# Patient Record
Sex: FEMALE | Race: BLACK OR AFRICAN AMERICAN | Employment: FULL TIME | URBAN - METROPOLITAN AREA
[De-identification: names, ages, dates, MRNs, and addresses within clinical notes are randomized per-mention and may not be internally consistent; named-entity substitution may affect disease eponyms.]

---

## 2017-02-17 ENCOUNTER — OFFICE VISIT (OUTPATIENT)
Dept: FAMILY MEDICINE CLINIC | Age: 47
End: 2017-02-17

## 2017-02-17 VITALS
BODY MASS INDEX: 31.72 KG/M2 | HEIGHT: 61 IN | TEMPERATURE: 97.5 F | DIASTOLIC BLOOD PRESSURE: 77 MMHG | SYSTOLIC BLOOD PRESSURE: 121 MMHG | RESPIRATION RATE: 20 BRPM | OXYGEN SATURATION: 98 % | WEIGHT: 168 LBS | HEART RATE: 83 BPM

## 2017-02-17 DIAGNOSIS — H10.12 ALLERGIC CONJUNCTIVITIS, LEFT: ICD-10-CM

## 2017-02-17 DIAGNOSIS — F41.9 ANXIETY: Primary | ICD-10-CM

## 2017-02-17 RX ORDER — RANITIDINE 150 MG/1
150 TABLET, FILM COATED ORAL 2 TIMES DAILY
COMMUNITY
End: 2017-10-06 | Stop reason: ALTCHOICE

## 2017-02-17 RX ORDER — LORAZEPAM 0.5 MG/1
TABLET ORAL
Qty: 30 TAB | Refills: 0 | Status: SHIPPED | OUTPATIENT
Start: 2017-02-17 | End: 2017-10-06 | Stop reason: ALTCHOICE

## 2017-02-17 RX ORDER — POLYMYXIN B SULFATE AND TRIMETHOPRIM 1; 10000 MG/ML; [USP'U]/ML
1 SOLUTION OPHTHALMIC EVERY 4 HOURS
Qty: 10 ML | Refills: 0 | Status: SHIPPED | OUTPATIENT
Start: 2017-02-17 | End: 2017-02-27

## 2017-02-17 NOTE — PATIENT INSTRUCTIONS
Pinkeye: Care Instructions  Your Care Instructions    Pinkeye is redness and swelling of the eye surface and the conjunctiva (the lining of the eyelid and the covering of the white part of the eye). Pinkeye is also called conjunctivitis. Pinkeye is often caused by infection with bacteria or a virus. Dry air, allergies, smoke, and chemicals are other common causes. Pinkeye often clears on its own in 7 to 10 days. Antibiotics only help if the pinkeye is caused by bacteria. Pinkeye caused by infection spreads easily. If an allergy or chemical is causing pinkeye, it will not go away unless you can avoid whatever is causing it. Follow-up care is a key part of your treatment and safety. Be sure to make and go to all appointments, and call your doctor if you are having problems. Its also a good idea to know your test results and keep a list of the medicines you take. How can you care for yourself at home? · Wash your hands often. Always wash them before and after you treat pinkeye or touch your eyes or face. · Use moist cotton or a clean, wet cloth to remove crust. Wipe from the inside corner of the eye to the outside. Use a clean part of the cloth for each wipe. · Put cold or warm wet cloths on your eye a few times a day if the eye hurts. · Do not wear contact lenses or eye makeup until the pinkeye is gone. Throw away any eye makeup you were using when you got pinkeye. Clean your contacts and storage case. If you wear disposable contacts, use a new pair when your eye has cleared and it is safe to wear contacts again. · If the doctor gave you antibiotic ointment or eyedrops, use them as directed. Use the medicine for as long as instructed, even if your eye starts looking better soon. Keep the bottle tip clean, and do not let it touch the eye area. · To put in eyedrops or ointment:  ¨ Tilt your head back, and pull your lower eyelid down with one finger.   ¨ Drop or squirt the medicine inside the lower lid.  ¨ Close your eye for 30 to 60 seconds to let the drops or ointment move around. ¨ Do not touch the ointment or dropper tip to your eyelashes or any other surface. · Do not share towels, pillows, or washcloths while you have pinkeye. When should you call for help? Call your doctor now or seek immediate medical care if:  · You have pain in your eye, not just irritation on the surface. · You have a change in vision or loss of vision. · You have an increase in discharge from the eye. · Your eye has not started to improve or begins to get worse within 48 hours after you start using antibiotics. · Pinkeye lasts longer than 7 days. Watch closely for changes in your health, and be sure to contact your doctor if you have any problems. Where can you learn more? Go to http://leanna-lee.info/. Enter Y392 in the search box to learn more about \"Pinkeye: Care Instructions. \"  Current as of: May 27, 2016  Content Version: 11.1  © 4450-8801 [employer]. Care instructions adapted under license by [employer] (which disclaims liability or warranty for this information). If you have questions about a medical condition or this instruction, always ask your healthcare professional. [address] any warranty or liability for your use of this information.

## 2017-02-17 NOTE — MR AVS SNAPSHOT
Visit Information Date & Time Provider Department Dept. Phone Encounter #  
 2/17/2017  9:30 AM Layla Ramríez MD [facility] at [address] [id number] Follow-up Instructions Return if symptoms worsen or fail to improve, for routine follow up. Upcoming Health Maintenance Date Due Pneumococcal 19-64 Highest Risk (1 of 3 - PCV13) 5/8/1988 PAP AKA CERVICAL CYTOLOGY 5/18/2017* DTaP/Tdap/Td series (2 - Td) 8/19/2026 *Topic was postponed. The date shown is not the original due date. Allergies as of 2/17/2017  Review Complete On: 2/17/2017 By: Layla Ramírez MD  
  
 Severity Noted Reaction Type Reactions Protonix [Pantoprazole] High 06/06/2010   Systemic Swelling Erythromycin Medium 06/06/2010   Side Effect Nausea and Vomiting Doxycycline  11/16/2011    Nausea and Vomiting Influenza Virus Vaccine [facility] 2012-13 (18+) Cell Prieto  06/27/2016    Unknown (comments) Patient stated \"it effected my whole body. \"  
 Sulfa (Sulfonamide Antibiotics)  08/31/2016    Unknown (comments) Pt states she cannot get sulfa due to it affecting her kidneys Augmentin [Amoxicillin-pot Clavulanate] Low 06/06/2010   Side Effect Nausea Only Current Immunizations  Never Reviewed No immunizations on file. Not reviewed this visit You Were Diagnosed With   
  
 Codes Comments Anxiety    -  Primary ICD-10-CM: F41.9 ICD-9-CM: 300.00 Allergic conjunctivitis, left     ICD-10-CM: H10.12 ICD-9-CM: 372.14 Vitals BP Pulse Temp Resp Height(growth percentile) Weight(growth percentile) 121/77 (BP 1 Location: Left arm, BP Patient Position: Sitting) 83 97.5 °F (36.4 °C) (Oral) 20 5' 1\" (1.549 m) 168 lb (76.2 kg) LMP SpO2 BMI OB Status Smoking Status 01/26/2017 98% 31.74 kg/m2 Having regular periods Never Smoker Vitals History BMI and BSA Data Body Mass Index Body Surface Area 31.74 kg/m 2 1.81 m 2 Preferred Pharmacy Pharmacy Name Phone [address] [facility] [phone] Your Updated Medication List  
  
   
This list is accurate as of: 2/17/17 10:57 AM.  Always use your most recent med list.  
  
  
  
  
 BABY ASPIRIN 81 mg chewable tablet Generic drug:  aspirin Take 81 mg by mouth daily. Indications: ACUTE CORONARY SYNDROME  
  
 clindamycin 1 % lotion Commonly known as:  CLEOCIN T Apply  to affected area as needed. use thin film on affected area FISH OIL 1,000 mg Cap Generic drug:  omega-3 fatty acids-vitamin e Take 1 Cap by mouth. fluocinoLONE 0.01 % cream  
Commonly known as:  SYNALAR Apply  to affected area two (2) times daily as needed for Skin Irritation. LORazepam 0.5 mg tablet Commonly known as:  ATIVAN TK 1 T PO ONCE QD PRA  
  
 multivitamin tablet Commonly known as:  ONE A DAY Take 1 Tab by mouth daily. trimethoprim-polymyxin b ophthalmic solution Commonly known as:  POLYTRIM Administer 1 Drop to both eyes every four (4) hours for 10 days. ZANTAC 150 mg tablet Generic drug:  raNITIdine Take 150 mg by mouth two (2) times a day. Prescriptions Printed Refills LORazepam (ATIVAN) 0.5 mg tablet 0 Sig: TK 1 T PO ONCE QD PRA Class: Print Prescriptions Sent to Pharmacy Refills  
 trimethoprim-polymyxin b (POLYTRIM) ophthalmic solution 0 Sig: Administer 1 Drop to both eyes every four (4) hours for 10 days. Class: Normal  
 Pharmacy: [facility] [address] [facility] Ph #: [phone] Route: Both Eyes Follow-up Instructions Return if symptoms worsen or fail to improve, for routine follow up. Patient Instructions Pinkeye: Care Instructions Your Care Instructions Pinkeye is redness and swelling of the eye surface and the conjunctiva (the lining of the eyelid and the covering of the white part of the eye). Pinkeye is also called conjunctivitis. Pinkeye is often caused by infection with bacteria or a virus. Dry air, allergies, smoke, and chemicals are other common causes. Pinkeye often clears on its own in 7 to 10 days. Antibiotics only help if the pinkeye is caused by bacteria. Pinkeye caused by infection spreads easily. If an allergy or chemical is causing pinkeye, it will not go away unless you can avoid whatever is causing it. Follow-up care is a key part of your treatment and safety. Be sure to make and go to all appointments, and call your doctor if you are having problems. Its also a good idea to know your test results and keep a list of the medicines you take. How can you care for yourself at home? · Wash your hands often. Always wash them before and after you treat pinkeye or touch your eyes or face. · Use moist cotton or a clean, wet cloth to remove crust. Wipe from the inside corner of the eye to the outside. Use a clean part of the cloth for each wipe. · Put cold or warm wet cloths on your eye a few times a day if the eye hurts. · Do not wear contact lenses or eye makeup until the pinkeye is gone. Throw away any eye makeup you were using when you got pinkeye. Clean your contacts and storage case. If you wear disposable contacts, use a new pair when your eye has cleared and it is safe to wear contacts again. · If the doctor gave you antibiotic ointment or eyedrops, use them as directed. Use the medicine for as long as instructed, even if your eye starts looking better soon. Keep the bottle tip clean, and do not let it touch the eye area. · To put in eyedrops or ointment: ¨ Tilt your head back, and pull your lower eyelid down with one finger. ¨ Drop or squirt the medicine inside the lower lid. ¨ Close your eye for 30 to 60 seconds to let the drops or ointment move around. ¨ Do not touch the ointment or dropper tip to your eyelashes or any other surface. · Do not share towels, pillows, or washcloths while you have pinkeye. When should you call for help? Call your doctor now or seek immediate medical care if: 
· You have pain in your eye, not just irritation on the surface. · You have a change in vision or loss of vision. · You have an increase in discharge from the eye. · Your eye has not started to improve or begins to get worse within 48 hours after you start using antibiotics. · Pinkeye lasts longer than 7 days. Watch closely for changes in your health, and be sure to contact your doctor if you have any problems. Where can you learn more? Go to http://leanna-lee.info/. Enter Y392 in the search box to learn more about \"Pinkeye: Care Instructions. \" Current as of: May 27, 2016 Content Version: 11.1 © 6201-5387 [employer]. Care instructions adapted under license by [employer] (which disclaims liability or warranty for this information). If you have questions about a medical condition or this instruction, always ask your healthcare professional. [address] any warranty or liability for your use of this information. Introducing [facility] [place] [facility]! Dear Michael Allred: Thank you for requesting a [employer] account. Our records indicate that you already have an active [employer] account. You can access your account anytime at [email or web address] Did you know that you can access your hospital and ER discharge instructions at any time in [employer]? You can also review all of your test results from your hospital stay or ER visit. Additional Information If you have questions, please visit the Frequently Asked Questions section of the [employer] website at [email or web address] Remember, [employer] is NOT to be used for urgent needs. For medical emergencies, dial 911. Now available from your iPhone and Android! Please provide this summary of care documentation to your next provider. Your primary care clinician is listed as Yaneth Burger. If you have any questions after today's visit, please call [phone].

## 2017-02-17 NOTE — PROGRESS NOTES
Chief Complaint   Patient presents with    Medication Refill    Other     ITCHY EYE    Cough     HPI:  Christine Restrepo is a 51 y.o. AA female presenting for anxiety medication refill, evaluation of itching eye and cough that started 2 weeks prior. She has been using otc treatment, cough is mostly gone but left eye is still itching. Review of Systems  As per hpi    Past Medical History   Diagnosis Date    Anxiety     Chronic shoulder pain     Gastrointestinal disorder      GERD    GERD (gastroesophageal reflux disease)     Headache     Migraine headache     Neck pain, bilateral     Neurological disorder      migraines    Other ill-defined conditions(129.89)      migraines    Psychiatric disorder      anxiety     Past Surgical History   Procedure Laterality Date    Hx heent       tonsillectomy    Hx gyn       cyst removed left ovary    Hx orthopaedic       left toe arthroplasty    Hx other surgical       left breast biopsy    Hx other surgical       wisdom teeth    Pr breast surgery procedure unlisted Left      Biopsy    Hx wisdom teeth extraction Bilateral      Social History    Marital status: SINGLE     Spouse name: N/A    Number of children: N/A    Years of education: N/A     Social History Main Topics    Smoking status: Never Smoker    Smokeless tobacco: Never Used    Alcohol use No    Drug use: No    Sexual activity: Yes     Partners: Female     Birth control/ protection: Condom     Current Outpatient Prescriptions   Medication Sig Dispense Refill    raNITIdine (ZANTAC) 150 mg tablet Take 150 mg by mouth two (2) times a day.  clindamycin (CLEOCIN T) 1 % lotion Apply  to affected area as needed. use thin film on affected area      fluocinoLONE (SYNALAR) 0.01 % cream Apply  to affected area two (2) times daily as needed for Skin Irritation.  LORazepam (ATIVAN) 0.5 mg tablet TK 1 T PO ONCE QD PRA  0    multivitamin (ONE A DAY) tablet Take 1 Tab by mouth daily.       omega-3 fatty acids-vitamin e (FISH OIL) 1,000 mg cap Take 1 Cap by mouth.  aspirin (BABY ASPIRIN) 81 mg chewable tablet Take 81 mg by mouth daily. Indications: ACUTE CORONARY SYNDROME       Allergies   Allergen Reactions    Protonix [Pantoprazole] Swelling    Erythromycin Nausea and Vomiting    Doxycycline Nausea and Vomiting    Influenza Virus Vaccine [facility] 2012-13 (18+) Cell Prieto Unknown (comments)     Patient stated \"it effected my whole body. \"    Sulfa (Sulfonamide Antibiotics) Unknown (comments)     Pt states she cannot get sulfa due to it affecting her kidneys    Augmentin [Amoxicillin-Pot Clavulanate] Nausea Only       Objective:  Visit Vitals    /77 (BP 1 Location: Left arm, BP Patient Position: Sitting)    Pulse 83    Temp 97.5 °F (36.4 °C) (Oral)    Resp 20    Ht 5' 1\" (1.549 m)    Wt 168 lb (76.2 kg)    LMP 01/26/2017    SpO2 98%    BMI 31.74 kg/m2     Physical Exam:   General appearance - alert, well appearing, in no distress  Mental status - alert, oriented to person, place, and time  EYE-PERRL, EOMI, mild left conjunctivae injection noted  Neck - supple, no significant adenopathy   Chest - clear to auscultation, no wheezes, rales or rhonchi  Heart - normal rate, regular rhythm, normal blood pressure  Ext-peripheral pulses normal, no pedal edema  Neuro -alert, oriented, normal speech, no focal findings     Results for orders placed or performed in visit on 40/41/23   METABOLIC PANEL, COMPREHENSIVE   Result Value Ref Range    Glucose 77 65 - 99 mg/dL    BUN 18 6 - 24 mg/dL    Creatinine 1.12 (H) 0.57 - 1.00 mg/dL    GFR est non-AA 59 (L) >59 mL/min/1.73    GFR est AA 68 >59 mL/min/1.73    BUN/Creatinine ratio 16 9 - 23    Sodium 137 136 - 144 mmol/L    Potassium 4.5 3.5 - 5.2 mmol/L    Chloride 99 97 - 106 mmol/L    CO2 24 18 - 29 mmol/L    Calcium 9.3 8.7 - 10.2 mg/dL    Protein, total 7.8 6.0 - 8.5 g/dL    Albumin 4.4 3.5 - 5.5 g/dL    GLOBULIN, TOTAL 3.4 1.5 - 4.5 g/dL    A-G Ratio 1.3 1.1 - 2.5    Bilirubin, total 0.3 0.0 - 1.2 mg/dL    Alk. phosphatase 52 39 - 117 IU/L    AST (SGOT) 20 0 - 40 IU/L    ALT (SGPT) 16 0 - 32 IU/L   VITAMIN D, 25 HYDROXY   Result Value Ref Range    VITAMIN D, 25-HYDROXY 35.7 30.0 - 100.0 ng/mL   HEMOGLOBIN A1C WITH EAG   Result Value Ref Range    Hemoglobin A1c 5.9 (H) 4.8 - 5.6 %    Estimated average glucose 123 mg/dL   UA/M W/RFLX CULTURE, ROUTINE   Result Value Ref Range    Specific Gravity 1.025 1.005 - 1.030    pH (UA) 6.0 5.0 - 7.5    Color Yellow Yellow    Appearance Clear Clear    Leukocyte Esterase Negative Negative    Protein Negative Negative/Trace    Glucose Negative Negative    Ketone Negative Negative    Blood 3+ (A) Negative    Bilirubin Negative Negative    Urobilinogen 0.2 0.2 - 1.0 mg/dL    Nitrites Negative Negative    Microscopic Examination See additional order     URINALYSIS REFLEX Comment    LIPID PANEL   Result Value Ref Range    Cholesterol, total 193 100 - 199 mg/dL    Triglyceride 44 0 - 149 mg/dL    HDL Cholesterol 74 >39 mg/dL    VLDL, calculated 9 5 - 40 mg/dL    LDL, calculated 110 (H) 0 - 99 mg/dL   MICROSCOPIC EXAMINATION   Result Value Ref Range    WBC 0-5 0 - 5 /hpf    RBC 11-30 (A) 0 - 2 /hpf    Epithelial cells 0-10 0 - 10 /hpf    Casts None seen None seen /lpf    Mucus Present Not Estab. Bacteria Few None seen/Few     Assessment/Plan:    ICD-10-CM ICD-9-CM    1. Anxiety F41.9 300.00 LORazepam (ATIVAN) 0.5 mg tablet   2. Allergic conjunctivitis, left H10.12 372.14 trimethoprim-polymyxin b (POLYTRIM) ophthalmic solution     Patient Instructions        Pinkeye: Care Instructions  Your Care Instructions    Pinkeye is redness and swelling of the eye surface and the conjunctiva (the lining of the eyelid and the covering of the white part of the eye). Pinkeye is also called conjunctivitis. Pinkeye is often caused by infection with bacteria or a virus. Dry air, allergies, smoke, and chemicals are other common causes.   Pinkeye often clears on its own in 7 to 10 days. Antibiotics only help if the pinkeye is caused by bacteria. Pinkeye caused by infection spreads easily. If an allergy or chemical is causing pinkeye, it will not go away unless you can avoid whatever is causing it. Follow-up care is a key part of your treatment and safety. Be sure to make and go to all appointments, and call your doctor if you are having problems. Its also a good idea to know your test results and keep a list of the medicines you take. How can you care for yourself at home? · Wash your hands often. Always wash them before and after you treat pinkeye or touch your eyes or face. · Use moist cotton or a clean, wet cloth to remove crust. Wipe from the inside corner of the eye to the outside. Use a clean part of the cloth for each wipe. · Put cold or warm wet cloths on your eye a few times a day if the eye hurts. · Do not wear contact lenses or eye makeup until the pinkeye is gone. Throw away any eye makeup you were using when you got pinkeye. Clean your contacts and storage case. If you wear disposable contacts, use a new pair when your eye has cleared and it is safe to wear contacts again. · If the doctor gave you antibiotic ointment or eyedrops, use them as directed. Use the medicine for as long as instructed, even if your eye starts looking better soon. Keep the bottle tip clean, and do not let it touch the eye area. · To put in eyedrops or ointment:  ¨ Tilt your head back, and pull your lower eyelid down with one finger. ¨ Drop or squirt the medicine inside the lower lid. ¨ Close your eye for 30 to 60 seconds to let the drops or ointment move around. ¨ Do not touch the ointment or dropper tip to your eyelashes or any other surface. · Do not share towels, pillows, or washcloths while you have pinkeye. When should you call for help?   Call your doctor now or seek immediate medical care if:  · You have pain in your eye, not just irritation on the surface. · You have a change in vision or loss of vision. · You have an increase in discharge from the eye. · Your eye has not started to improve or begins to get worse within 48 hours after you start using antibiotics. · Pinkeye lasts longer than 7 days. Watch closely for changes in your health, and be sure to contact your doctor if you have any problems. Where can you learn more? Go to http://leanna-lee.info/. Enter Y392 in the search box to learn more about \"Pinkeye: Care Instructions. \"  Current as of: May 27, 2016  Content Version: 11.1  © 7125-2372 [employer]. Care instructions adapted under license by [employer] (which disclaims liability or warranty for this information). If you have questions about a medical condition or this instruction, always ask your healthcare professional. [address] any warranty or liability for your use of this information. Follow-up Disposition:  Return if symptoms worsen or fail to improve, for routine follow up.

## 2017-02-17 NOTE — PROGRESS NOTES
1. Have you been to the ER, urgent care clinic since your last visit? Hospitalized since your last visit? No    2. Have you seen or consulted any other health care providers outside of the [address] [place] [address] since your last visit? Include any pap smears or colon screening. No      Pt states she is here for med refills and cold for 2 weeks.  States she still has a slight cough

## 2017-05-10 ENCOUNTER — OFFICE VISIT (OUTPATIENT)
Dept: FAMILY MEDICINE CLINIC | Age: 47
End: 2017-05-10

## 2017-05-10 VITALS
BODY MASS INDEX: 31.15 KG/M2 | DIASTOLIC BLOOD PRESSURE: 84 MMHG | WEIGHT: 165 LBS | HEART RATE: 80 BPM | RESPIRATION RATE: 16 BRPM | HEIGHT: 61 IN | OXYGEN SATURATION: 97 % | SYSTOLIC BLOOD PRESSURE: 138 MMHG | TEMPERATURE: 98.1 F

## 2017-05-10 DIAGNOSIS — N17.9 ACUTE RENAL FAILURE, UNSPECIFIED ACUTE RENAL FAILURE TYPE (HCC): ICD-10-CM

## 2017-05-10 DIAGNOSIS — E78.5 DYSLIPIDEMIA (HIGH LDL; LOW HDL): ICD-10-CM

## 2017-05-10 DIAGNOSIS — R03.0 ELEVATED BLOOD-PRESSURE READING, WITHOUT DIAGNOSIS OF HYPERTENSION: ICD-10-CM

## 2017-05-10 DIAGNOSIS — E55.9 HYPOVITAMINOSIS D: Primary | ICD-10-CM

## 2017-05-10 DIAGNOSIS — R73.02 IGT (IMPAIRED GLUCOSE TOLERANCE): ICD-10-CM

## 2017-05-10 NOTE — PROGRESS NOTES
Chief Complaint   Patient presents with    Follow-up     HPI:  Kristy Chin is a 49 y.o. AA female presents follow-up. Patient is doing good. She saw GI in 4/2017, refilled gavascon, she saw neurologists yesterday for migraine. Blood pressure reading on arrival was 143/92, at end of visit VO=997/84. Patient does not want antihypertensive at this time  No complaints at this time. Review of Systems  As per hpi    Past Medical History:   Diagnosis Date    Anxiety     Chronic shoulder pain     Gastrointestinal disorder     GERD    GERD (gastroesophageal reflux disease)     Headache     Migraine headache     Neck pain, bilateral     Neurological disorder     migraines    Other ill-defined conditions     migraines    Psychiatric disorder     anxiety     Past Surgical History:   Procedure Laterality Date    BREAST SURGERY PROCEDURE UNLISTED Left     Biopsy    HX GYN      cyst removed left ovary    HX HEENT      tonsillectomy    HX ORTHOPAEDIC      left toe arthroplasty    HX OTHER SURGICAL      left breast biopsy    HX OTHER SURGICAL      wisdom teeth    HX WISDOM TEETH EXTRACTION Bilateral      Social History    Marital status: SINGLE     Spouse name: N/A    Number of children: N/A    Years of education: N/A     Social History Main Topics    Smoking status: Never Smoker    Smokeless tobacco: Never Used    Alcohol use No    Drug use: No    Sexual activity: Yes     Partners: Female     Birth control/ protection: Condom     Current Outpatient Prescriptions   Medication Sig Dispense Refill    raNITIdine (ZANTAC) 150 mg tablet Take 150 mg by mouth two (2) times a day.  LORazepam (ATIVAN) 0.5 mg tablet TK 1 T PO ONCE QD PRA 30 Tab 0    clindamycin (CLEOCIN T) 1 % lotion Apply  to affected area as needed. use thin film on affected area      fluocinoLONE (SYNALAR) 0.01 % cream Apply  to affected area two (2) times daily as needed for Skin Irritation.       multivitamin (ONE A DAY) tablet Take 1 Tab by mouth daily.  omega-3 fatty acids-vitamin e (FISH OIL) 1,000 mg cap Take 1 Cap by mouth.  aspirin (BABY ASPIRIN) 81 mg chewable tablet Take 81 mg by mouth daily. Indications: ACUTE CORONARY SYNDROME       Allergies   Allergen Reactions    Protonix [Pantoprazole] Swelling    Erythromycin Nausea and Vomiting    Doxycycline Nausea and Vomiting    Influenza Virus Vaccine [facility] 2012-13 (18+) Cell Prieto Unknown (comments)     Patient stated \"it effected my whole body. \"    Sulfa (Sulfonamide Antibiotics) Unknown (comments)     Pt states she cannot get sulfa due to it affecting her kidneys    Augmentin [Amoxicillin-Pot Clavulanate] Nausea Only     Objective:  Visit Vitals    BP (!) 143/92 (BP 1 Location: Left arm, BP Patient Position: Sitting)    Pulse 80    Temp 98.1 °F (36.7 °C) (Oral)    Resp 16    Ht 5' 1\" (1.549 m)    Wt 165 lb (74.8 kg)    LMP 04/28/2017    SpO2 97%    BMI 31.18 kg/m2     Physical Exam:   General appearance - alert, well appearing, in no distress  Mental status - alert, oriented to person, place, and time  EYE-PERRL, EOMI  Neck - supple, no significant adenopathy   Chest - clear to auscultation, no wheezes, rales or rhonchi  Heart - normal rate, regular rhythm, elevated blood pressure  Abdomen - soft, nontender, nondistended, no organomegaly  Ext-peripheral pulses normal, no pedal edema  Neuro -alert, oriented, normal speech, no focal findings    Results for orders placed or performed in visit on 51/02/27   METABOLIC PANEL, COMPREHENSIVE   Result Value Ref Range    Glucose 77 65 - 99 mg/dL    BUN 18 6 - 24 mg/dL    Creatinine 1.12 (H) 0.57 - 1.00 mg/dL    GFR est non-AA 59 (L) >59 mL/min/1.73    GFR est AA 68 >59 mL/min/1.73    BUN/Creatinine ratio 16 9 - 23    Sodium 137 136 - 144 mmol/L    Potassium 4.5 3.5 - 5.2 mmol/L    Chloride 99 97 - 106 mmol/L    CO2 24 18 - 29 mmol/L    Calcium 9.3 8.7 - 10.2 mg/dL    Protein, total 7.8 6.0 - 8.5 g/dL    Albumin 4.4 3.5 - 5.5 g/dL    GLOBULIN, TOTAL 3.4 1.5 - 4.5 g/dL    A-G Ratio 1.3 1.1 - 2.5    Bilirubin, total 0.3 0.0 - 1.2 mg/dL    Alk. phosphatase 52 39 - 117 IU/L    AST (SGOT) 20 0 - 40 IU/L    ALT (SGPT) 16 0 - 32 IU/L   VITAMIN D, 25 HYDROXY   Result Value Ref Range    VITAMIN D, 25-HYDROXY 35.7 30.0 - 100.0 ng/mL   HEMOGLOBIN A1C WITH EAG   Result Value Ref Range    Hemoglobin A1c 5.9 (H) 4.8 - 5.6 %    Estimated average glucose 123 mg/dL   UA/M W/RFLX CULTURE, ROUTINE   Result Value Ref Range    Specific Gravity 1.025 1.005 - 1.030    pH (UA) 6.0 5.0 - 7.5    Color Yellow Yellow    Appearance Clear Clear    Leukocyte Esterase Negative Negative    Protein Negative Negative/Trace    Glucose Negative Negative    Ketone Negative Negative    Blood 3+ (A) Negative    Bilirubin Negative Negative    Urobilinogen 0.2 0.2 - 1.0 mg/dL    Nitrites Negative Negative    Microscopic Examination See additional order     URINALYSIS REFLEX Comment    LIPID PANEL   Result Value Ref Range    Cholesterol, total 193 100 - 199 mg/dL    Triglyceride 44 0 - 149 mg/dL    HDL Cholesterol 74 >39 mg/dL    VLDL, calculated 9 5 - 40 mg/dL    LDL, calculated 110 (H) 0 - 99 mg/dL   MICROSCOPIC EXAMINATION   Result Value Ref Range    WBC 0-5 0 - 5 /hpf    RBC 11-30 (A) 0 - 2 /hpf    Epithelial cells 0-10 0 - 10 /hpf    Casts None seen None seen /lpf    Mucus Present Not Estab. Bacteria Few None seen/Few     Assessment/Plan:    ICD-10-CM ICD-9-CM    1. Hypovitaminosis D E55.9 268.9 VITAMIN D, 25 HYDROXY   2. Acute renal failure, unspecified acute renal failure type ([facility] [address] V15.3 619.5 METABOLIC PANEL, COMPREHENSIVE   3. Dyslipidemia (high LDL; low HDL) E78.4 272.4 LIPID PANEL   4. IGT (impaired glucose tolerance) R73.02 790.22 HEMOGLOBIN A1C WITH EAG   5.  Elevated blood-pressure reading, without diagnosis of hypertension R03.0 796.2 UA/M W/RFLX CULTURE, ROUTINE      METABOLIC PANEL, COMPREHENSIVE     Patient Instructions        DASH Diet: Care Instructions  Your Care Instructions  The DASH diet is an eating plan that can help lower your blood pressure. DASH stands for Dietary Approaches to Stop Hypertension. Hypertension is high blood pressure. The DASH diet focuses on eating foods that are high in calcium, potassium, and magnesium. These nutrients can lower blood pressure. The foods that are highest in these nutrients are fruits, vegetables, low-fat dairy products, nuts, seeds, and legumes. But taking calcium, potassium, and magnesium supplements instead of eating foods that are high in those nutrients does not have the same effect. The DASH diet also includes whole grains, fish, and poultry. The DASH diet is one of several lifestyle changes your doctor may recommend to lower your high blood pressure. Your doctor may also want you to decrease the amount of sodium in your diet. Lowering sodium while following the DASH diet can lower blood pressure even further than just the DASH diet alone. Follow-up care is a key part of your treatment and safety. Be sure to make and go to all appointments, and call your doctor if you are having problems. It's also a good idea to know your test results and keep a list of the medicines you take. How can you care for yourself at home? Following the DASH diet  · Eat 4 to 5 servings of fruit each day. A serving is 1 medium-sized piece of fruit, ½ cup chopped or canned fruit, 1/4 cup dried fruit, or 4 ounces (½ cup) of fruit juice. Choose fruit more often than fruit juice. · Eat 4 to 5 servings of vegetables each day. A serving is 1 cup of lettuce or raw leafy vegetables, ½ cup of chopped or cooked vegetables, or 4 ounces (½ cup) of vegetable juice. Choose vegetables more often than vegetable juice. · Get 2 to 3 servings of low-fat and fat-free dairy each day. A serving is 8 ounces of milk, 1 cup of yogurt, or 1 ½ ounces of cheese. · Eat 6 to 8 servings of grains each day.  A serving is 1 slice of bread, 1 ounce of dry cereal, or ½ cup of cooked rice, pasta, or cooked cereal. Try to choose whole-grain products as much as possible. · Limit lean meat, poultry, and fish to 2 servings each day. A serving is 3 ounces, about the size of a deck of cards. · Eat 4 to 5 servings of nuts, seeds, and legumes (cooked dried beans, lentils, and split peas) each week. A serving is 1/3 cup of nuts, 2 tablespoons of seeds, or ½ cup of cooked beans or peas. · Limit fats and oils to 2 to 3 servings each day. A serving is 1 teaspoon of vegetable oil or 2 tablespoons of salad dressing. · Limit sweets and added sugars to 5 servings or less a week. A serving is 1 tablespoon jelly or jam, ½ cup sorbet, or 1 cup of lemonade. · Eat less than 2,300 milligrams (mg) of sodium a day. If you limit your sodium to 1,500 mg a day, you can lower your blood pressure even more. Tips for success  · Start small. Do not try to make dramatic changes to your diet all at once. You might feel that you are missing out on your favorite foods and then be more likely to not follow the plan. Make small changes, and stick with them. Once those changes become habit, add a few more changes. · Try some of the following:  ¨ Make it a goal to eat a fruit or vegetable at every meal and at snacks. This will make it easy to get the recommended amount of fruits and vegetables each day. ¨ Try yogurt topped with fruit and nuts for a snack or healthy dessert. ¨ Add lettuce, tomato, cucumber, and onion to sandwiches. ¨ Combine a ready-made pizza crust with low-fat mozzarella cheese and lots of vegetable toppings. Try using tomatoes, squash, spinach, broccoli, carrots, cauliflower, and onions. ¨ Have a variety of cut-up vegetables with a low-fat dip as an appetizer instead of chips and dip. ¨ Sprinkle sunflower seeds or chopped almonds over salads. Or try adding chopped walnuts or almonds to cooked vegetables. ¨ Try some vegetarian meals using beans and peas.  Add garbanzo or kidney beans to salads. Make burritos and tacos with mashed sanchez beans or black beans. Where can you learn more? Go to http://leanna-lee.info/. Enter F232 in the search box to learn more about \"DASH Diet: Care Instructions. \"  Current as of: March 23, 2016  Content Version: 11.2  © 3168-9900 [employer]. Care instructions adapted under license by [employer] (which disclaims liability or warranty for this information). If you have questions about a medical condition or this instruction, always ask your healthcare professional. [place] [address] any warranty or liability for your use of this information. Follow-up Disposition:  Return in about 3 months (around 8/10/2017), or if symptoms worsen or fail to improve, for routine follow up.

## 2017-05-10 NOTE — MR AVS SNAPSHOT
Visit Information Date & Time Provider Department Dept. Phone Encounter #  
 5/10/2017  2:45 PM Anais Stringer MD [facility] at [address] [id number] Follow-up Instructions Return in about 3 months (around 8/10/2017), or if symptoms worsen or fail to improve, for routine follow up. Upcoming Health Maintenance Date Due  
 PAP AKA CERVICAL CYTOLOGY 5/18/2017* INFLUENZA AGE 9 TO ADULT 8/1/2017 Pneumococcal 19-64 Highest Risk (2 of 3 - PCV13) 5/10/2018 DTaP/Tdap/Td series (2 - Td) 8/19/2026 *Topic was postponed. The date shown is not the original due date. Allergies as of 5/10/2017  Review Complete On: 5/10/2017 By: Anais Stringer MD  
  
 Severity Noted Reaction Type Reactions Protonix [Pantoprazole] High 06/06/2010   Systemic Swelling Erythromycin Medium 06/06/2010   Side Effect Nausea and Vomiting Doxycycline  11/16/2011    Nausea and Vomiting Influenza Virus Vaccine [facility] 2012-13 (18+) Cell Prieto  06/27/2016    Unknown (comments) Patient stated \"it effected my whole body. \"  
 Sulfa (Sulfonamide Antibiotics)  08/31/2016    Unknown (comments) Pt states she cannot get sulfa due to it affecting her kidneys Augmentin [Amoxicillin-pot Clavulanate] Low 06/06/2010   Side Effect Nausea Only Current Immunizations  Never Reviewed No immunizations on file. Not reviewed this visit You Were Diagnosed With   
  
 Codes Comments Hypovitaminosis D    -  Primary ICD-10-CM: E55.9 ICD-9-CM: 268.9 Acute renal failure, unspecified acute renal failure type ([facility] [address]     ICD-10-CM: N17.9 ICD-9-CM: 711. 9 Dyslipidemia (high LDL; low HDL)     ICD-10-CM: E78.4 ICD-9-CM: 272.4 IGT (impaired glucose tolerance)     ICD-10-CM: R73.02 
ICD-9-CM: 790.22 Elevated blood-pressure reading, without diagnosis of hypertension     ICD-10-CM: R03.0 ICD-9-CM: 796.2 Vitals BP Pulse Temp Resp Height(growth percentile) Weight(growth percentile) (!) 143/92 (BP 1 Location: Left arm, BP Patient Position: Sitting) 80 98.1 °F (36.7 °C) (Oral) 16 5' 1\" (1.549 m) 165 lb (74.8 kg) LMP SpO2 BMI OB Status Smoking Status 04/28/2017 97% 31.18 kg/m2 Having regular periods Never Smoker Vitals History BMI and BSA Data Body Mass Index Body Surface Area  
 31.18 kg/m 2 1.79 m 2 Preferred Pharmacy Pharmacy Name Phone [address] [facility] [address] [facility] [phone] Your Updated Medication List  
  
   
This list is accurate as of: 5/10/17  3:40 PM.  Always use your most recent med list.  
  
  
  
  
 BABY ASPIRIN 81 mg chewable tablet Generic drug:  aspirin Take 81 mg by mouth daily. Indications: ACUTE CORONARY SYNDROME  
  
 clindamycin 1 % lotion Commonly known as:  CLEOCIN T Apply  to affected area as needed. use thin film on affected area FISH OIL 1,000 mg Cap Generic drug:  omega-3 fatty acids-vitamin e Take 1 Cap by mouth. fluocinoLONE 0.01 % cream  
Commonly known as:  SYNALAR Apply  to affected area two (2) times daily as needed for Skin Irritation. LORazepam 0.5 mg tablet Commonly known as:  ATIVAN TK 1 T PO ONCE QD PRA  
  
 multivitamin tablet Commonly known as:  ONE A DAY Take 1 Tab by mouth daily. ZANTAC 150 mg tablet Generic drug:  raNITIdine Take 150 mg by mouth two (2) times a day. We Performed the Following HEMOGLOBIN A1C WITH EAG [06954 CPT(R)] LIPID PANEL [19123 CPT(R)] METABOLIC PANEL, COMPREHENSIVE [72876 CPT(R)] UA/M W/RFLX CULTURE, ROUTINE [[id number] Custom] VITAMIN D, 25 HYDROXY F390771 CPT(R)] Follow-up Instructions Return in about 3 months (around 8/10/2017), or if symptoms worsen or fail to improve, for routine follow up. Patient Instructions DASH Diet: Care Instructions Your Care Instructions The DASH diet is an eating plan that can help lower your blood pressure. DASH stands for Dietary Approaches to Stop Hypertension. Hypertension is high blood pressure. The DASH diet focuses on eating foods that are high in calcium, potassium, and magnesium. These nutrients can lower blood pressure. The foods that are highest in these nutrients are fruits, vegetables, low-fat dairy products, nuts, seeds, and legumes. But taking calcium, potassium, and magnesium supplements instead of eating foods that are high in those nutrients does not have the same effect. The DASH diet also includes whole grains, fish, and poultry. The DASH diet is one of several lifestyle changes your doctor may recommend to lower your high blood pressure. Your doctor may also want you to decrease the amount of sodium in your diet. Lowering sodium while following the DASH diet can lower blood pressure even further than just the DASH diet alone. Follow-up care is a key part of your treatment and safety. Be sure to make and go to all appointments, and call your doctor if you are having problems. It's also a good idea to know your test results and keep a list of the medicines you take. How can you care for yourself at home? Following the DASH diet · Eat 4 to 5 servings of fruit each day. A serving is 1 medium-sized piece of fruit, ½ cup chopped or canned fruit, 1/4 cup dried fruit, or 4 ounces (½ cup) of fruit juice. Choose fruit more often than fruit juice. · Eat 4 to 5 servings of vegetables each day. A serving is 1 cup of lettuce or raw leafy vegetables, ½ cup of chopped or cooked vegetables, or 4 ounces (½ cup) of vegetable juice. Choose vegetables more often than vegetable juice. · Get 2 to 3 servings of low-fat and fat-free dairy each day. A serving is 8 ounces of milk, 1 cup of yogurt, or 1 ½ ounces of cheese. · Eat 6 to 8 servings of grains each day. A serving is 1 slice of bread, 1 ounce of dry cereal, or ½ cup of cooked rice, pasta, or cooked cereal. Try to choose whole-grain products as much as possible. · Limit lean meat, poultry, and fish to 2 servings each day. A serving is 3 ounces, about the size of a deck of cards. · Eat 4 to 5 servings of nuts, seeds, and legumes (cooked dried beans, lentils, and split peas) each week. A serving is 1/3 cup of nuts, 2 tablespoons of seeds, or ½ cup of cooked beans or peas. · Limit fats and oils to 2 to 3 servings each day. A serving is 1 teaspoon of vegetable oil or 2 tablespoons of salad dressing. · Limit sweets and added sugars to 5 servings or less a week. A serving is 1 tablespoon jelly or jam, ½ cup sorbet, or 1 cup of lemonade. · Eat less than 2,300 milligrams (mg) of sodium a day. If you limit your sodium to 1,500 mg a day, you can lower your blood pressure even more. Tips for success · Start small. Do not try to make dramatic changes to your diet all at once. You might feel that you are missing out on your favorite foods and then be more likely to not follow the plan. Make small changes, and stick with them. Once those changes become habit, add a few more changes. · Try some of the following: ¨ Make it a goal to eat a fruit or vegetable at every meal and at snacks. This will make it easy to get the recommended amount of fruits and vegetables each day. ¨ Try yogurt topped with fruit and nuts for a snack or healthy dessert. ¨ Add lettuce, tomato, cucumber, and onion to sandwiches. ¨ Combine a ready-made pizza crust with low-fat mozzarella cheese and lots of vegetable toppings. Try using tomatoes, squash, spinach, broccoli, carrots, cauliflower, and onions. ¨ Have a variety of cut-up vegetables with a low-fat dip as an appetizer instead of chips and dip. ¨ Sprinkle sunflower seeds or chopped almonds over salads.  Or try adding chopped walnuts or almonds to cooked vegetables. ¨ Try some vegetarian meals using beans and peas. Add garbanzo or kidney beans to salads. Make burritos and tacos with mashed sanchez beans or black beans. Where can you learn more? Go to http://leanna-lee.info/. Enter U327 in the search box to learn more about \"DASH Diet: Care Instructions. \" Current as of: March 23, 2016 Content Version: 11.2 © 2617-1948 [employer]. Care instructions adapted under license by [employer] (which disclaims liability or warranty for this information). If you have questions about a medical condition or this instruction, always ask your healthcare professional. [address] any warranty or liability for your use of this information. Introducing [facility]! Dear Ana Webster: Thank you for requesting a [employer] account. Our records indicate that you already have an active [employer] account. You can access your account anytime at [email or web address] Did you know that you can access your hospital and ER discharge instructions at any time in [employer]? You can also review all of your test results from your hospital stay or ER visit. Additional Information If you have questions, please visit the Frequently Asked Questions section of the [employer] website at [email or web address] Remember, [employer] is NOT to be used for urgent needs. For medical emergencies, dial 911. Now available from your iPhone and Android! Please provide this summary of care documentation to your next provider. Your primary care clinician is listed as Livier Miller. If you have any questions after today's visit, please call [phone].

## 2017-05-10 NOTE — PATIENT INSTRUCTIONS

## 2017-05-10 NOTE — PROGRESS NOTES
1. Have you been to the ER, urgent care clinic since your last visit? Hospitalized since your last visit? No    2. Have you seen or consulted any other health care providers outside of the [address] [place] [address] since your last visit? Include any pap smears or colon screening. No     Pt states she is here for follow up.

## 2017-10-06 ENCOUNTER — OFFICE VISIT (OUTPATIENT)
Dept: FAMILY MEDICINE CLINIC | Age: 47
End: 2017-10-06

## 2017-10-06 VITALS
DIASTOLIC BLOOD PRESSURE: 77 MMHG | RESPIRATION RATE: 16 BRPM | BODY MASS INDEX: 32.36 KG/M2 | HEIGHT: 61 IN | SYSTOLIC BLOOD PRESSURE: 125 MMHG | TEMPERATURE: 97.2 F | WEIGHT: 171.4 LBS | OXYGEN SATURATION: 99 % | HEART RATE: 72 BPM

## 2017-10-06 DIAGNOSIS — R35.0 FREQUENCY OF URINATION: ICD-10-CM

## 2017-10-06 DIAGNOSIS — N89.8 DISCHARGE OF VAGINA: Primary | ICD-10-CM

## 2017-10-06 LAB
BILIRUB UR QL STRIP: NEGATIVE
GLUCOSE UR-MCNC: NEGATIVE MG/DL
KETONES P FAST UR STRIP-MCNC: NEGATIVE MG/DL
PH UR STRIP: 5.5 [PH] (ref 4.6–8)
PROT UR QL STRIP: NEGATIVE MG/DL
SP GR UR STRIP: 1.02 (ref 1–1.03)
UA UROBILINOGEN AMB POC: NORMAL (ref 0.2–1)
URINALYSIS CLARITY POC: CLEAR
URINALYSIS COLOR POC: YELLOW
URINE BLOOD POC: NORMAL
URINE LEUKOCYTES POC: NEGATIVE
URINE NITRITES POC: NEGATIVE

## 2017-10-06 RX ORDER — FLUCONAZOLE 150 MG/1
150 TABLET ORAL DAILY
Qty: 1 TAB | Refills: 0 | Status: SHIPPED | OUTPATIENT
Start: 2017-10-06 | End: 2017-10-07

## 2017-10-06 NOTE — PATIENT INSTRUCTIONS

## 2017-10-06 NOTE — MR AVS SNAPSHOT
Visit Information Date & Time Provider Department Dept. Phone Encounter #  
 10/6/2017  8:15 AM Vielka Cordero MD [facility] at [address] [id number] Follow-up Instructions Return in about 4 months (around 2/6/2018), or if symptoms worsen or fail to improve, for routine follow up. Upcoming Health Maintenance Date Due Pneumococcal 19-64 Highest Risk (2 of 3 - PCV13) 5/10/2018 PAP AKA CERVICAL CYTOLOGY 10/6/2020 DTaP/Tdap/Td series (2 - Td) 8/19/2026 Allergies as of 10/6/2017  Review Complete On: 10/6/2017 By: Vielka Cordero MD  
  
 Severity Noted Reaction Type Reactions Protonix [Pantoprazole] High 06/06/2010   Systemic Swelling Erythromycin Medium 06/06/2010   Side Effect Nausea and Vomiting Doxycycline  11/16/2011    Nausea and Vomiting Influenza Virus Vaccine [facility] 2012-13 (18+) Cell Prieto  06/27/2016    Unknown (comments) Patient stated \"it effected my whole body. \"  
 Sulfa (Sulfonamide Antibiotics)  08/31/2016    Unknown (comments) Pt states she cannot get sulfa due to it affecting her kidneys Augmentin [Amoxicillin-pot Clavulanate] Low 06/06/2010   Side Effect Nausea Only Current Immunizations  Never Reviewed No immunizations on file. Not reviewed this visit You Were Diagnosed With   
  
 Codes Comments Discharge of vagina    -  Primary ICD-10-CM: N89.8 ICD-9-CM: 623.5 Frequency of urination     ICD-10-CM: R35.0 ICD-9-CM: 788.41 Vitals BP Pulse Temp Resp Height(growth percentile) Weight(growth percentile) 125/77 (BP 1 Location: Left arm, BP Patient Position: Sitting) 72 97.2 °F (36.2 °C) (Oral) 16 5' 1\" (1.549 m) 171 lb 6.4 oz (77.7 kg) LMP SpO2 BMI OB Status Smoking Status 09/17/2017 99% 32.39 kg/m2 Having regular periods Never Smoker Vitals History BMI and BSA Data Body Mass Index Body Surface Area  
 32.39 kg/m 2 1.83 m 2 Preferred Pharmacy Pharmacy Name Phone [facility], [address] [facility] [phone] Your Updated Medication List  
  
   
This list is accurate as of: 10/6/17  9:23 AM.  Always use your most recent med list.  
  
  
  
  
 fluconazole 150 mg tablet Commonly known as:  DIFLUCAN Take 1 Tab by mouth daily for 1 day. FDA advises cautious prescribing of oral fluconazole in pregnancy. fluocinoLONE 0.01 % cream  
Commonly known as:  SYNALAR Apply  to affected area two (2) times daily as needed for Skin Irritation. GAVISCON  mg/15 mL suspension Generic drug:  aluminum hydrox-magnesium carb Take 15 mL by mouth every six (6) hours as needed for Indigestion. multivitamin tablet Commonly known as:  ONE A DAY Take 1 Tab by mouth daily. Prescriptions Sent to Pharmacy Refills  
 fluconazole (DIFLUCAN) 150 mg tablet 0 Sig: Take 1 Tab by mouth daily for 1 day. FDA advises cautious prescribing of oral fluconazole in pregnancy. Class: Normal  
 Pharmacy: [facility] [address] AT [address] [facility] #: [phone] Route: Oral  
  
We Performed the Following AMB POC URINALYSIS DIP STICK AUTO W/O MICRO [18693 CPT(R)] [address] [facility] [id number] Custom] Follow-up Instructions Return in about 4 months (around 2/6/2018), or if symptoms worsen or fail to improve, for routine follow up. Patient Instructions Vaginal Yeast Infection: Care Instructions Your Care Instructions A vaginal yeast infection is caused by too many yeast cells in the vagina. This is common in women of all ages. Itching, vaginal discharge and irritation, and other symptoms can bother you. But yeast infections don't often cause other health problems. Some medicines can increase your risk of getting a yeast infection.  These include antibiotics, birth control pills, hormones, and steroids. You may also be more likely to get a yeast infection if you are pregnant, have diabetes, douche, or wear tight clothes. With treatment, most yeast infections get better in 2 to 3 days. Follow-up care is a key part of your treatment and safety. Be sure to make and go to all appointments, and call your doctor if you are having problems. It's also a good idea to know your test results and keep a list of the medicines you take. How can you care for yourself at home? · Take your medicines exactly as prescribed. Call your doctor if you think you are having a problem with your medicine. · Ask your doctor about over-the-counter (OTC) medicines for yeast infections. They may cost less than prescription medicines. If you use an OTC treatment, read and follow all instructions on the label. · Do not use tampons while using a vaginal cream or suppository. The tampons can absorb the medicine. Use pads instead. · Wear loose cotton clothing. Do not wear nylon or other fabric that holds body heat and moisture close to the skin. · Try sleeping without underwear. · Do not scratch. Relieve itching with a cold pack or a cool bath. · Do not wash your vaginal area more than once a day. Use plain water or a mild, unscented soap. Air-dry the vaginal area. · Change out of wet swimsuits after swimming. · Do not have sex until you have finished your treatment. · Do not douche. When should you call for help? Call your doctor now or seek immediate medical care if: 
· You have unexpected vaginal bleeding. · You have new or increased pain in your vagina or pelvis. Watch closely for changes in your health, and be sure to contact your doctor if: 
· You have a fever. · You are not getting better after 2 days. · Your symptoms come back after you finish your medicines. Where can you learn more? Go to http://leanna-lee.info/. Enter P576 in the search box to learn more about \"Vaginal Yeast Infection: Care Instructions. \" Current as of: October 13, 2016 Content Version: 11.3 © 6829-2945 [employer]. Care instructions adapted under license by [employer] (which disclaims liability or warranty for this information). If you have questions about a medical condition or this instruction, always ask your healthcare professional. [address] any warranty or liability for your use of this information. Introducing [facility]! Dear Marija Carrizales: Thank you for requesting a [employer] account. Our records indicate that you already have an active [employer] account. You can access your account anytime at [email or web address] Did you know that you can access your hospital and ER discharge instructions at any time in [employer]? You can also review all of your test results from your hospital stay or ER visit. Additional Information If you have questions, please visit the Frequently Asked Questions section of the [employer] website at [email or web address] Remember, [employer] is NOT to be used for urgent needs. For medical emergencies, dial 911. Now available from your iPhone and Android! Please provide this summary of care documentation to your next provider. Your primary care clinician is listed as Ian Mariano. If you have any questions after today's visit, please call [phone].

## 2017-10-06 NOTE — PROGRESS NOTES
Chief Complaint   Patient presents with    Cholesterol Problem    Bladder Infection     HPI:  Vicente Spears is a 49 y.o. AA female presents with high cholesterol managed on diet and exercise presents to follow up  Patient is complaining of urinary frequency with foul odor and and thick yellow vaginal discharge for 2 weeks. Denies abdominal pain, fever/chills. Review of Systems  As per hpi    Past Medical History:   Diagnosis Date    Anxiety     Chronic shoulder pain     Gastrointestinal disorder     GERD    GERD (gastroesophageal reflux disease)     Headache     Migraine headache     Neck pain, bilateral     Neurological disorder     migraines    Other ill-defined conditions(799.89)     migraines    Psychiatric disorder     anxiety     Past Surgical History:   Procedure Laterality Date    BREAST SURGERY PROCEDURE UNLISTED Left     Biopsy    HX GYN      cyst removed left ovary    HX HEENT      tonsillectomy    HX ORTHOPAEDIC      left toe arthroplasty    HX OTHER SURGICAL      left breast biopsy    HX OTHER SURGICAL      wisdom teeth    HX WISDOM TEETH EXTRACTION Bilateral      Social History     Social History    Marital status: SINGLE     Spouse name: N/A    Number of children: N/A    Years of education: N/A     Social History Main Topics    Smoking status: Never Smoker    Smokeless tobacco: Never Used    Alcohol use No    Drug use: No    Sexual activity: Yes     Partners: Female     Birth control/ protection: Condom     Other Topics Concern    None     Social History Narrative     Family History   Problem Relation Age of Onset    Cancer Mother      Pancreatic    Cancer Father      Lung    Other Brother      Gunshot     Current Outpatient Prescriptions   Medication Sig Dispense Refill    aluminum hydrox-magnesium carb (GAVISCON)  mg/15 mL suspension Take 15 mL by mouth every six (6) hours as needed for Indigestion.       fluocinoLONE (SYNALAR) 0.01 % cream Apply  to affected area two (2) times daily as needed for Skin Irritation.  raNITIdine (ZANTAC) 150 mg tablet Take 150 mg by mouth two (2) times a day.  LORazepam (ATIVAN) 0.5 mg tablet TK 1 T PO ONCE QD PRA 30 Tab 0    clindamycin (CLEOCIN T) 1 % lotion Apply  to affected area as needed. use thin film on affected area      multivitamin (ONE A DAY) tablet Take 1 Tab by mouth daily.  omega-3 fatty acids-vitamin e (FISH OIL) 1,000 mg cap Take 1 Cap by mouth.  aspirin (BABY ASPIRIN) 81 mg chewable tablet Take 81 mg by mouth daily. Indications: ACUTE CORONARY SYNDROME       Allergies   Allergen Reactions    Protonix [Pantoprazole] Swelling    Erythromycin Nausea and Vomiting    Doxycycline Nausea and Vomiting    Influenza Virus Vaccine [facility] 2012-13 (18+) Cell Prieto Unknown (comments)     Patient stated \"it effected my whole body. \"    Sulfa (Sulfonamide Antibiotics) Unknown (comments)     Pt states she cannot get sulfa due to it affecting her kidneys    Augmentin [Amoxicillin-Pot Clavulanate] Nausea Only     Objective:  Visit Vitals    /77 (BP 1 Location: Left arm, BP Patient Position: Sitting)    Pulse 72    Temp 97.2 °F (36.2 °C) (Oral)    Resp 16    Ht 5' 1\" (1.549 m)    Wt 171 lb 6.4 oz (77.7 kg)    LMP 09/17/2017    SpO2 99%    BMI 32.39 kg/m2     Physical Exam:   General appearance - alert, well appearing in no distress  Mental status - alert, oriented to person, place, and time  EYE-PERRL, EOMI  Neck - supple, no significant adenopathy   Chest - clear to auscultation, no wheezes, rales or rhonchi  Heart - normal rate, regular rhythm, normal blood pressure  Abdomen - soft, nontender, nondistended, no organomegaly  Ext-peripheral pulses normal, no pedal edema  Pelvic exam: VAGINA: vaginal discharge - curd-like, yellow malodorous     Recent Results (from the past 12 hour(s))   AMB POC URINALYSIS DIP STICK AUTO W/O MICRO    Collection Time: 10/06/17  9:00 AM   Result Value Ref Range    Color (UA POC) Yellow     Clarity (UA POC) Clear     Glucose (UA POC) Negative Negative    Bilirubin (UA POC) Negative Negative    Ketones (UA POC) Negative Negative    Specific gravity (UA POC) 1.025 1.001 - 1.035    Blood (UA POC) Trace Negative    pH (UA POC) 5.5 4.6 - 8.0    Protein (UA POC) Negative Negative mg/dL    Urobilinogen (UA POC) 0.2 mg/dL 0.2 - 1    Nitrites (UA POC) Negative Negative    Leukocyte esterase (UA POC) Negative Negative     Assessment/Plan:    ICD-10-CM ICD-9-CM    1. Discharge of vagina N89.8 623.5 NUSWAB VAGINITIS PLUS      fluconazole (DIFLUCAN) 150 mg tablet   2. Frequency of urination R35.0 788.41 AMB POC URINALYSIS DIP STICK AUTO W/O MICRO     Patient Instructions        Vaginal Yeast Infection: Care Instructions  Your Care Instructions  A vaginal yeast infection is caused by too many yeast cells in the vagina. This is common in women of all ages. Itching, vaginal discharge and irritation, and other symptoms can bother you. But yeast infections don't often cause other health problems. Some medicines can increase your risk of getting a yeast infection. These include antibiotics, birth control pills, hormones, and steroids. You may also be more likely to get a yeast infection if you are pregnant, have diabetes, douche, or wear tight clothes. With treatment, most yeast infections get better in 2 to 3 days. Follow-up care is a key part of your treatment and safety. Be sure to make and go to all appointments, and call your doctor if you are having problems. It's also a good idea to know your test results and keep a list of the medicines you take. How can you care for yourself at home? · Take your medicines exactly as prescribed. Call your doctor if you think you are having a problem with your medicine. · Ask your doctor about over-the-counter (OTC) medicines for yeast infections. They may cost less than prescription medicines.  If you use an OTC treatment, read and follow all instructions on the label. · Do not use tampons while using a vaginal cream or suppository. The tampons can absorb the medicine. Use pads instead. · Wear loose cotton clothing. Do not wear nylon or other fabric that holds body heat and moisture close to the skin. · Try sleeping without underwear. · Do not scratch. Relieve itching with a cold pack or a cool bath. · Do not wash your vaginal area more than once a day. Use plain water or a mild, unscented soap. Air-dry the vaginal area. · Change out of wet swimsuits after swimming. · Do not have sex until you have finished your treatment. · Do not douche. When should you call for help? Call your doctor now or seek immediate medical care if:  · You have unexpected vaginal bleeding. · You have new or increased pain in your vagina or pelvis. Watch closely for changes in your health, and be sure to contact your doctor if:  · You have a fever. · You are not getting better after 2 days. · Your symptoms come back after you finish your medicines. Where can you learn more? Go to http://leanna-lee.info/. Enter K580 in the search box to learn more about \"Vaginal Yeast Infection: Care Instructions. \"  Current as of: October 13, 2016  Content Version: 11.3  © 2519-4046 [employer]. Care instructions adapted under license by [employer] (which disclaims liability or warranty for this information). If you have questions about a medical condition or this instruction, always ask your healthcare professional. [place] [address] any warranty or liability for your use of this information. Follow-up Disposition:  Return in about 4 months (around 2/6/2018), or if symptoms worsen or fail to improve, for routine follow up.

## 2017-10-06 NOTE — PROGRESS NOTES
Chief Complaint   Patient presents with    Cholesterol Problem    Bladder Infection     3 mo check. Urinary frequency & itching.

## 2017-10-10 ENCOUNTER — TELEPHONE (OUTPATIENT)
Dept: FAMILY MEDICINE CLINIC | Age: 47
End: 2017-10-10

## 2017-10-10 NOTE — TELEPHONE ENCOUNTER
----- Message from Franklin Prater sent at 10/10/2017  4:35 PM EDT -----  Regarding: Dr. Robina Headley  Patient would like to get her test results. Her number is [phone].

## 2017-10-12 LAB
A VAGINAE DNA VAG QL NAA+PROBE: NORMAL SCORE
BVAB2 DNA VAG QL NAA+PROBE: NORMAL SCORE
C ALBICANS DNA VAG QL NAA+PROBE: NEGATIVE
C GLABRATA DNA VAG QL NAA+PROBE: NEGATIVE
C TRACH RRNA SPEC QL NAA+PROBE: NEGATIVE
MEGA1 DNA VAG QL NAA+PROBE: NORMAL SCORE
N GONORRHOEA RRNA SPEC QL NAA+PROBE: NEGATIVE
T VAGINALIS RRNA SPEC QL NAA+PROBE: NEGATIVE

## 2018-01-08 ENCOUNTER — OFFICE VISIT (OUTPATIENT)
Dept: FAMILY MEDICINE CLINIC | Age: 48
End: 2018-01-08

## 2018-01-08 VITALS
RESPIRATION RATE: 20 BRPM | BODY MASS INDEX: 33.42 KG/M2 | HEART RATE: 84 BPM | DIASTOLIC BLOOD PRESSURE: 80 MMHG | SYSTOLIC BLOOD PRESSURE: 140 MMHG | OXYGEN SATURATION: 98 % | WEIGHT: 177 LBS | TEMPERATURE: 97.4 F | HEIGHT: 61 IN

## 2018-01-08 DIAGNOSIS — E78.5 DYSLIPIDEMIA (HIGH LDL; LOW HDL): ICD-10-CM

## 2018-01-08 DIAGNOSIS — E55.9 HYPOVITAMINOSIS D: Primary | ICD-10-CM

## 2018-01-08 DIAGNOSIS — R73.02 IGT (IMPAIRED GLUCOSE TOLERANCE): ICD-10-CM

## 2018-01-08 DIAGNOSIS — R05.9 COUGH: ICD-10-CM

## 2018-01-08 NOTE — PROGRESS NOTES
Chief Complaint   Patient presents with    Hypertension     Patient is here today for 3 month follow up and head cold, taking OTC x 2 days.

## 2018-01-08 NOTE — PROGRESS NOTES
Chief Complaint   Patient presents with    Hypertension     HPI:  Jt Landin is a 49 y.o. AA female presents evaluate elevated blood pressure without history of hypertension. Since her last visit SBP has been frequently at 140 without symptoms. she had a mammogram with negative result. She has appointment to follow up with her gyn for endometrial biopsy next week. She completed PT for muscle tightness recently and feels fine. Also, she has additional complaints of cough, described as dry cough that started 01/05/18, no fever/chills, sob or wheezing.     Review of System  As per hpi    Past Medical History:   Diagnosis Date    Anxiety     Chronic shoulder pain     Gastrointestinal disorder     GERD    GERD (gastroesophageal reflux disease)     Headache     Migraine headache     Neck pain, bilateral     Neurological disorder     migraines    Other ill-defined conditions(799.89)     migraines    Psychiatric disorder     anxiety     Past Surgical History:   Procedure Laterality Date    BREAST SURGERY PROCEDURE UNLISTED Left     Biopsy    HX GYN      cyst removed left ovary    HX HEENT      tonsillectomy    HX ORTHOPAEDIC      left toe arthroplasty    HX OTHER SURGICAL      left breast biopsy    HX OTHER SURGICAL      wisdom teeth    HX WISDOM TEETH EXTRACTION Bilateral      Social History     Social History    Marital status: SINGLE     Spouse name: N/A    Number of children: N/A    Years of education: N/A     Social History Main Topics    Smoking status: Never Smoker    Smokeless tobacco: Never Used    Alcohol use No    Drug use: No    Sexual activity: Yes     Partners: Female     Birth control/ protection: Condom     Other Topics Concern    None     Social History Narrative     Family History   Problem Relation Age of Onset    Cancer Mother      Pancreatic    Cancer Father      Lung    Other Brother      Gunshot     Current Outpatient Prescriptions   Medication Sig Dispense Refill  aluminum hydrox-magnesium carb (GAVISCON)  mg/15 mL suspension Take 15 mL by mouth every six (6) hours as needed for Indigestion.  fluocinoLONE (SYNALAR) 0.01 % cream Apply  to affected area two (2) times daily as needed for Skin Irritation. Allergies   Allergen Reactions    Protonix [Pantoprazole] Swelling    Erythromycin Nausea and Vomiting    Doxycycline Nausea and Vomiting    Influenza Virus Vaccine [facility] 2012-13 (18+) Cell Prieto Unknown (comments)     Patient stated \"it effected my whole body. \"    Sulfa (Sulfonamide Antibiotics) Unknown (comments)     Pt states she cannot get sulfa due to it affecting her kidneys    Augmentin [Amoxicillin-Pot Clavulanate] Nausea Only     Objective:  Visit Vitals    /80    Pulse 84    Temp 97.4 °F (36.3 °C) (Oral)    Resp 20    Ht 5' 1\" (1.549 m)    Wt 177 lb (80.3 kg)    LMP 11/11/2017    SpO2 98%    BMI 33.44 kg/m2     Physical Exam:   General appearance - alert,oriented X 3, well appearing in no distress  EYE-PERRL, EOMI  ENT-ENT exam normal, no neck nodes or sinus tenderness  Mouth - mucous membranes moist, pharynx normal without lesions  Neck - supple, no significant adenopathy   Chest - clear to auscultation, no wheezes, rales or rhonchi  Heart - normal rate, regular rhythm, normal   Abdomen - soft, nontender, nondistended, no organomegaly  Lymph- no adenopathy palpable  Ext-peripheral pulses normal, no pedal edema  Skin-Warm and dry.  no hyperpigmentation  Neuro -alert, oriented, normal speech, no focal findings  Back-full range of motion, no tenderness, palpable spasm or pain on motion     Results for orders placed or performed in visit on 10/06/17   NUSWAB VAGINITIS PLUS   Result Value Ref Range    Atopobium vaginae Low - 0 Score    BVAB 2 Low - 0 Score    Megasphaera 1 Low - 0 Score    C. albicans, VINH Negative Negative    C. glabrata, VINH Negative Negative    T. vaginalis, VINH Negative Negative    C. trachomatis, VINH Negative Negative    N. gonorrhoeae, VINH Negative Negative   AMB POC URINALYSIS DIP STICK AUTO W/O MICRO   Result Value Ref Range    Color (UA POC) Yellow     Clarity (UA POC) Clear     Glucose (UA POC) Negative Negative    Bilirubin (UA POC) Negative Negative    Ketones (UA POC) Negative Negative    Specific gravity (UA POC) 1.025 1.001 - 1.035    Blood (UA POC) Trace Negative    pH (UA POC) 5.5 4.6 - 8.0    Protein (UA POC) Negative Negative mg/dL    Urobilinogen (UA POC) 0.2 mg/dL 0.2 - 1    Nitrites (UA POC) Negative Negative    Leukocyte esterase (UA POC) Negative Negative     Assessment/Plan:    ICD-10-CM ICD-9-CM    1. Hypovitaminosis D E55.9 268.9    2. Dyslipidemia (high LDL; low HDL) E78.4 272.4    3. IGT (impaired glucose tolerance) R73.02 790.22    4. Cough R05 786.2 PSEUDOEPHEDRINE-dextromethorphan-guaiFENesin (ROBITUSSIN-CE) 30- mg/5 mL solution     Patient Instructions          Cough: Care Instructions  Your Care Instructions    A cough is your body's response to something that bothers your throat or airways. Many things can cause a cough. You might cough because of a cold or the flu, bronchitis, or asthma. Smoking, postnasal drip, allergies, and stomach acid that backs up into your throat also can cause coughs. A cough is a symptom, not a disease. Most coughs stop when the cause, such as a cold, goes away. You can take a few steps at home to cough less and feel better. Follow-up care is a key part of your treatment and safety. Be sure to make and go to all appointments, and call your doctor if you are having problems. It's also a good idea to know your test results and keep a list of the medicines you take. How can you care for yourself at home? · Drink lots of water and other fluids. This helps thin the mucus and soothes a dry or sore throat. Honey or lemon juice in hot water or tea may ease a dry cough. · Take cough medicine as directed by your doctor.   · Prop up your head on pillows to help you breathe and ease a dry cough. · Try cough drops to soothe a dry or sore throat. Cough drops don't stop a cough. Medicine-flavored cough drops are no better than candy-flavored drops or hard candy. · Do not smoke. Avoid secondhand smoke. If you need help quitting, talk to your doctor about stop-smoking programs and medicines. These can increase your chances of quitting for good. When should you call for help? Call 911 anytime you think you may need emergency care. For example, call if:  ? · You have severe trouble breathing. ?Call your doctor now or seek immediate medical care if:  ? · You cough up blood. ? · You have new or worse trouble breathing. ? · You have a new or higher fever. ? · You have a new rash. ? Watch closely for changes in your health, and be sure to contact your doctor if:  ? · You cough more deeply or more often, especially if you notice more mucus or a change in the color of your mucus. ? · You have new symptoms, such as a sore throat, an earache, or sinus pain. ? · You do not get better as expected. Where can you learn more? Go to http://leanna-lee.info/. Enter D279 in the search box to learn more about \"Cough: Care Instructions. \"  Current as of: May 12, 2017  Content Version: 11.4  © 0039-6115 "[employer]". Care instructions adapted under license by [employer] (which disclaims liability or warranty for this information). If you have questions about a medical condition or this instruction, always ask your healthcare professional. [place] [address] any warranty or liability for your use of this information. Follow-up Disposition:  Return in about 3 months (around 4/8/2018), or if symptoms worsen or fail to improve, for routine follow up.

## 2018-01-08 NOTE — PATIENT INSTRUCTIONS

## 2018-01-08 NOTE — MR AVS SNAPSHOT
Visit Information Date & Time Provider Department Dept. Phone Encounter #  
 1/8/2018  8:00 AM Dhiraj Ann MD [facility] at [address] [id number] Follow-up Instructions Return in about 3 months (around 4/8/2018), or if symptoms worsen or fail to improve, for routine follow up. Upcoming Health Maintenance Date Due Pneumococcal 19-64 Highest Risk (2 of 3 - PCV13) 5/10/2018 PAP AKA CERVICAL CYTOLOGY 10/6/2020 DTaP/Tdap/Td series (2 - Td) 8/19/2026 Allergies as of 1/8/2018  Review Complete On: 1/8/2018 By: Dhiraj Ann MD  
  
 Severity Noted Reaction Type Reactions Protonix [Pantoprazole] High 06/06/2010   Systemic Swelling Erythromycin Medium 06/06/2010   Side Effect Nausea and Vomiting Doxycycline  11/16/2011    Nausea and Vomiting Influenza Virus Vaccine [facility] 2012-13 (18+) Cell Prieto  06/27/2016    Unknown (comments) Patient stated \"it effected my whole body. \"  
 Sulfa (Sulfonamide Antibiotics)  08/31/2016    Unknown (comments) Pt states she cannot get sulfa due to it affecting her kidneys Augmentin [Amoxicillin-pot Clavulanate] Low 06/06/2010   Side Effect Nausea Only Current Immunizations  Never Reviewed No immunizations on file. Not reviewed this visit You Were Diagnosed With   
  
 Codes Comments Hypovitaminosis D    -  Primary ICD-10-CM: E55.9 ICD-9-CM: 268.9 Dyslipidemia (high LDL; low HDL)     ICD-10-CM: E78.4 ICD-9-CM: 272.4 IGT (impaired glucose tolerance)     ICD-10-CM: R73.02 
ICD-9-CM: 790.22 Cough     ICD-10-CM: R05 ICD-9-CM: 454. 2 Vitals BP Pulse Temp Resp Height(growth percentile) Weight(growth percentile) 140/80 84 97.4 °F (36.3 °C) (Oral) 20 5' 1\" (1.549 m) 177 lb (80.3 kg) LMP SpO2 BMI OB Status Smoking Status 11/11/2017 98% 33.44 kg/m2 Having regular periods Never Smoker Vitals History BMI and BSA Data Body Mass Index Body Surface Area 33.44 kg/m 2 1.86 m 2 Preferred Pharmacy Pharmacy Name Phone [facility] [employer] [facility] [facility] [phone] Your Updated Medication List  
  
   
This list is accurate as of: 1/8/18  9:15 AM.  Always use your most recent med list.  
  
  
  
  
 fluocinoLONE 0.01 % cream  
Commonly known as:  SYNALAR Apply  to affected area two (2) times daily as needed for Skin Irritation. GAVISCON  mg/15 mL suspension Generic drug:  aluminum hydrox-magnesium carb Take 15 mL by mouth every six (6) hours as needed for Indigestion. PSEUDOEPHEDRINE-dextromethorphan-guaiFENesin 30- mg/5 mL solution Commonly known as:  ROBITUSSIN-CE Take 5 mL by mouth every four (4) hours as needed for Cough for up to 7 days. Indications: Cough Prescriptions Sent to Pharmacy Refills PSEUDOEPHEDRINE-dextromethorphan-guaiFENesin (ROBITUSSIN-CE) 30- mg/5 mL solution 0 Sig: Take 5 mL by mouth every four (4) hours as needed for Cough for up to 7 days. Indications: Cough Class: Normal  
 Pharmacy: [facility] [address] [place] [address] RD AT [address] [facility] #: [phone] Route: Oral  
  
Follow-up Instructions Return in about 3 months (around 4/8/2018), or if symptoms worsen or fail to improve, for routine follow up. Patient Instructions Cough: Care Instructions Your Care Instructions A cough is your body's response to something that bothers your throat or airways. Many things can cause a cough. You might cough because of a cold or the flu, bronchitis, or asthma. Smoking, postnasal drip, allergies, and stomach acid that backs up into your throat also can cause coughs. A cough is a symptom, not a disease. Most coughs stop when the cause, such as a cold, goes away. You can take a few steps at home to cough less and feel better. Follow-up care is a key part of your treatment and safety. Be sure to make and go to all appointments, and call your doctor if you are having problems. It's also a good idea to know your test results and keep a list of the medicines you take. How can you care for yourself at home? · Drink lots of water and other fluids. This helps thin the mucus and soothes a dry or sore throat. Honey or lemon juice in hot water or tea may ease a dry cough. · Take cough medicine as directed by your doctor. · Prop up your head on pillows to help you breathe and ease a dry cough. · Try cough drops to soothe a dry or sore throat. Cough drops don't stop a cough. Medicine-flavored cough drops are no better than candy-flavored drops or hard candy. · Do not smoke. Avoid secondhand smoke. If you need help quitting, talk to your doctor about stop-smoking programs and medicines. These can increase your chances of quitting for good. When should you call for help? Call 911 anytime you think you may need emergency care. For example, call if: 
? · You have severe trouble breathing. ?Call your doctor now or seek immediate medical care if: 
? · You cough up blood. ? · You have new or worse trouble breathing. ? · You have a new or higher fever. ? · You have a new rash. ? Watch closely for changes in your health, and be sure to contact your doctor if: 
? · You cough more deeply or more often, especially if you notice more mucus or a change in the color of your mucus. ? · You have new symptoms, such as a sore throat, an earache, or sinus pain. ? · You do not get better as expected. Where can you learn more? Go to http://leanna-lee.info/. Enter D279 in the search box to learn more about \"Cough: Care Instructions. \" Current as of: May 12, 2017 Content Version: 11.4 © 1402-4716 Healthwise, [employer].  Care instructions adapted under license by [employer] (which disclaims liability or warranty for this information). If you have questions about a medical condition or this instruction, always ask your healthcare professional. [address] any warranty or liability for your use of this information. Introducing [facility]! [facility]: Thank you for requesting a [employer] account. Our records indicate that you already have an active [employer] account. You can access your account anytime at [email or web address] Did you know that you can access your hospital and ER discharge instructions at any time in [employer]land? You can also review all of your test results from your hospital stay or ER visit. Additional Information If you have questions, please visit the Frequently Asked Questions section of the [employer] website at [email or web address] Remember, [employer] is NOT to be used for urgent needs. For medical emergencies, dial 911. Now available from your iPhone and Android! Please provide this summary of care documentation to your next provider. Your primary care clinician is listed as Pola Marcus. If you have any questions after today's visit, please call [phone].

## 2018-04-09 ENCOUNTER — OFFICE VISIT (OUTPATIENT)
Dept: FAMILY MEDICINE CLINIC | Age: 48
End: 2018-04-09

## 2018-04-09 VITALS
SYSTOLIC BLOOD PRESSURE: 136 MMHG | DIASTOLIC BLOOD PRESSURE: 89 MMHG | RESPIRATION RATE: 20 BRPM | TEMPERATURE: 97.1 F | BODY MASS INDEX: 30.4 KG/M2 | HEIGHT: 61 IN | HEART RATE: 72 BPM | WEIGHT: 161 LBS | OXYGEN SATURATION: 97 %

## 2018-04-09 DIAGNOSIS — E78.5 DYSLIPIDEMIA (HIGH LDL; LOW HDL): ICD-10-CM

## 2018-04-09 DIAGNOSIS — Z13.29 SCREENING FOR THYROID DISORDER: ICD-10-CM

## 2018-04-09 DIAGNOSIS — Z00.00 ENCOUNTER FOR ANNUAL PHYSICAL EXAM: Primary | ICD-10-CM

## 2018-04-09 DIAGNOSIS — N17.8 ACUTE RENAL FAILURE WITH OTHER SPECIFIED PATHOLOGICAL LESION IN KIDNEY (HCC): ICD-10-CM

## 2018-04-09 DIAGNOSIS — E55.9 HYPOVITAMINOSIS D: ICD-10-CM

## 2018-04-09 DIAGNOSIS — R73.02 IGT (IMPAIRED GLUCOSE TOLERANCE): ICD-10-CM

## 2018-04-09 DIAGNOSIS — F41.0 PANIC DISORDER: ICD-10-CM

## 2018-04-09 RX ORDER — ZINC GLUCONATE 10 MG
LOZENGE ORAL
COMMUNITY
End: 2020-11-09

## 2018-04-09 RX ORDER — LORAZEPAM 0.5 MG/1
TABLET ORAL
Qty: 30 TAB | Refills: 0 | Status: SHIPPED | OUTPATIENT
Start: 2018-04-09 | End: 2019-05-13 | Stop reason: SDUPTHER

## 2018-04-09 RX ORDER — MULTIVIT WITH MINERALS/HERBS
1 TABLET ORAL DAILY
COMMUNITY
End: 2021-02-15

## 2018-04-09 RX ORDER — ASCORBIC ACID 250 MG
TABLET ORAL
COMMUNITY
End: 2021-02-15

## 2018-04-09 NOTE — PATIENT INSTRUCTIONS

## 2018-04-09 NOTE — PROGRESS NOTES
Chief Complaint   Patient presents with    Hypertension    Labs    Medication Refill     Subjective:  Prateek Chow is a 49 y.o. AA female for annual medical exam.   Her gyne and breast care is done elsewhere by her Ob-Gyne physician. Current Outpatient Prescriptions   Medication Sig Dispense Refill    ascorbic acid, vitamin C, (VITAMIN C) 250 mg tablet Take  by mouth.  magnesium 250 mg tab Take  by mouth.  b complex vitamins tablet Take 1 Tab by mouth daily.  LORazepam (ATIVAN) 0.5 mg tablet TK 1 T PO ONCE QD PRA 30 Tab 0     Allergies   Allergen Reactions    Protonix [Pantoprazole] Swelling    Erythromycin Nausea and Vomiting    Doxycycline Nausea and Vomiting    Influenza Virus Vaccine [facility] 2012-13 (18+) Cell Prieto Unknown (comments)     Patient stated \"it effected my whole body. \"    Sulfa (Sulfonamide Antibiotics) Unknown (comments)     Pt states she cannot get sulfa due to it affecting her kidneys    Augmentin [Amoxicillin-Pot Clavulanate] Nausea Only     Past Medical History:   Diagnosis Date    Anxiety     Chronic shoulder pain     Gastrointestinal disorder     GERD    GERD (gastroesophageal reflux disease)     Headache     Migraine headache     Neck pain, bilateral     Neurological disorder     migraines    Other ill-defined conditions(389.89)     migraines    Psychiatric disorder     anxiety     Past Surgical History:   Procedure Laterality Date    BREAST SURGERY PROCEDURE UNLISTED Left     Biopsy    HX GYN      cyst removed left ovary    HX HEENT      tonsillectomy    HX ORTHOPAEDIC      left toe arthroplasty    HX OTHER SURGICAL      left breast biopsy    HX OTHER SURGICAL      wisdom teeth    HX WISDOM TEETH EXTRACTION Bilateral      Family History   Problem Relation Age of Onset    Cancer Mother      Pancreatic    Cancer Father      Lung    Other Brother      Gunshot     Social History   Substance Use Topics    Smoking status: Never Smoker    Smokeless tobacco: Never Used    Alcohol use No      ROS:  Feeling generally well  No unusual headaches, no dysphagia  No prolonged cough. No dyspnea or chest pain on exertion  No abdominal pain, change in bowel habits, black or bloody stools  No urinary tract symptoms  No new or unusual musculoskeletal symptoms. Specific concerns today: none    Objective:  Blood pressure 136/89, pulse 72, temperature 97.1 °F (36.2 °C), temperature source Oral, resp. rate 20, height 5' 1\" (1.549 m), weight 161 lb (73 kg), last menstrual period 03/30/2018, SpO2 97 %. Patient appears well, alert, oriented x 3, in no distress. Patient's last menstrual period was 03/30/2018. ENT normal.  Neck supple. No adenopathy or thyromegaly. PERRL. Lungs are clear, good air entry, no wheezes, rhonchi or rales  CVS: S1 and S2 normal, no murmurs, regular rate and rhythm  Abdomen soft without tenderness, guarding, mass or organomegaly  Extremities show no edema, normal peripheral pulses  Neurological is normal, no focal findings. Breast and Pelvic exams are deferred. Assessment/Plan:  Diagnoses and all orders for this visit:    Encounter for annual physical exam  -     METABOLIC PANEL, COMPREHENSIVE  -     CBC WITH AUTOMATED DIFF    Hypovitaminosis D  -     VITAMIN D, 25 HYDROXY    Dyslipidemia (high LDL; low HDL)  -     LIPID PANEL    Acute renal failure with other specified pathological lesion in kidney (HCC)  -     METABOLIC PANEL, COMPREHENSIVE    IGT (impaired glucose tolerance)  -     HEMOGLOBIN A1C WITH EAG    Panic disorder  -     LORazepam (ATIVAN) 0.5 mg tablet; TK 1 T PO ONCE QD PRA, Print, Disp-30 Tab, R-0    Screening for thyroid disorder  -     TSH 3RD GENERATION      Patient Instructions        Well Visit, Ages 25 to 48: Care Instructions  Your Care Instructions    Physical exams can help you stay healthy. Your doctor has checked your overall health and may have suggested ways to take good care of yourself.  He or she also may have recommended tests. At home, you can help prevent illness with healthy eating, regular exercise, and other steps. Follow-up care is a key part of your treatment and safety. Be sure to make and go to all appointments, and call your doctor if you are having problems. It's also a good idea to know your test results and keep a list of the medicines you take. How can you care for yourself at home? · Reach and stay at a healthy weight. This will lower your risk for many problems, such as obesity, diabetes, heart disease, and high blood pressure. · Get at least 30 minutes of physical activity on most days of the week. Walking is a good choice. You also may want to do other activities, such as running, swimming, cycling, or playing tennis or team sports. Discuss any changes in your exercise program with your doctor. · Do not smoke or allow others to smoke around you. If you need help quitting, talk to your doctor about stop-smoking programs and medicines. These can increase your chances of quitting for good. · Talk to your doctor about whether you have any risk factors for sexually transmitted infections (STIs). Having one sex partner (who does not have STIs and does not have sex with anyone else) is a good way to avoid these infections. · Use birth control if you do not want to have children at this time. Talk with your doctor about the choices available and what might be best for you. · Protect your skin from too much sun. When you're outdoors from 10 a.m. to 4 p.m., stay in the shade or cover up with clothing and a hat with a wide brim. Wear sunglasses that block UV rays. Even when it's cloudy, put broad-spectrum sunscreen (SPF 30 or higher) on any exposed skin. · See a dentist one or two times a year for checkups and to have your teeth cleaned. · Wear a seat belt in the car. · Drink alcohol in moderation, if at all. That means no more than 2 drinks a day for men and 1 drink a day for women.   Follow your doctor's advice about when to have certain tests. These tests can spot problems early. For everyone  · Cholesterol. Have the fat (cholesterol) in your blood tested after age 21. Your doctor will tell you how often to have this done based on your age, family history, or other things that can increase your risk for heart disease. · Blood pressure. Have your blood pressure checked during a routine doctor visit. Your doctor will tell you how often to check your blood pressure based on your age, your blood pressure results, and other factors. · Vision. Talk with your doctor about how often to have a glaucoma test.  · Diabetes. Ask your doctor whether you should have tests for diabetes. · Colon cancer. Have a test for colon cancer at age 48. You may have one of several tests. If you are younger than 48, you may need a test earlier if you have any risk factors. Risk factors include whether you already had a precancerous polyp removed from your colon or whether your parent, brother, sister, or child has had colon cancer. For women  · Breast exam and mammogram. Talk to your doctor about when you should have a clinical breast exam and a mammogram. Medical experts differ on whether and how often women under 50 should have these tests. Your doctor can help you decide what is right for you. · Pap test and pelvic exam. Begin Pap tests at age 24. A Pap test is the best way to find cervical cancer. The test often is part of a pelvic exam. Ask how often to have this test.  · Tests for sexually transmitted infections (STIs). Ask whether you should have tests for STIs. You may be at risk if you have sex with more than one person, especially if your partners do not wear condoms. For men  · Tests for sexually transmitted infections (STIs). Ask whether you should have tests for STIs. You may be at risk if you have sex with more than one person, especially if you do not wear a condom.   · Testicular cancer exam. Ask your doctor whether you should check your testicles regularly. · Prostate exam. Talk to your doctor about whether you should have a blood test (called a PSA test) for prostate cancer. Experts differ on whether and when men should have this test. Some experts suggest it if you are older than 39 and are -American or have a father or brother who got prostate cancer when he was younger than 72. When should you call for help? Watch closely for changes in your health, and be sure to contact your doctor if you have any problems or symptoms that concern you. Where can you learn more? Go to http://leanna-lee.info/. Enter P072 in the search box to learn more about \"Well Visit, Ages 25 to 48: Care Instructions. \"  Current as of: May 12, 2017  Content Version: 11.4  © 0907-9043 Healthwise, Incorporated. Care instructions adapted under license by [employer] (which disclaims liability or warranty for this information). If you have questions about a medical condition or this instruction, always ask your healthcare professional. [place] [address] any warranty or liability for your use of this information. Follow-up Disposition:  Return in about 4 months (around 8/9/2018), or if symptoms worsen or fail to improve, for routine follow up.

## 2018-04-09 NOTE — MR AVS SNAPSHOT
[facility] [address]. 
[phone] Patient: Kaitlynn Strickland MRN: [id number] YQL:[id number] Visit Information Date & Time Provider Department Dept. Phone Encounter #  
 4/9/2018  2:00 PM Kit English MD [facility] at [address] [id number] Follow-up Instructions Return in about 4 months (around 8/9/2018), or if symptoms worsen or fail to improve, for routine follow up. Upcoming Health Maintenance Date Due Pneumococcal 19-64 Highest Risk (2 of 3 - PCV13) 5/10/2018 PAP AKA CERVICAL CYTOLOGY 10/6/2020 DTaP/Tdap/Td series (2 - Td) 8/19/2026 Allergies as of 4/9/2018  Review Complete On: 4/9/2018 By: Kit English MD  
  
 Severity Noted Reaction Type Reactions Protonix [Pantoprazole] High 06/06/2010   Systemic Swelling Erythromycin Medium 06/06/2010   Side Effect Nausea and Vomiting Doxycycline  11/16/2011    Nausea and Vomiting Influenza Virus Vaccine [facility] 2012-13 (18+) Cell Prieto  06/27/2016    Unknown (comments) Patient stated \"it effected my whole body. \"  
 Sulfa (Sulfonamide Antibiotics)  08/31/2016    Unknown (comments) Pt states she cannot get sulfa due to it affecting her kidneys Augmentin [Amoxicillin-pot Clavulanate] Low 06/06/2010   Side Effect Nausea Only Current Immunizations  Never Reviewed No immunizations on file. Not reviewed this visit You Were Diagnosed With   
  
 Codes Comments Encounter for annual physical exam    -  Primary ICD-10-CM: Z00.00 ICD-9-CM: V70.0 Hypovitaminosis D     ICD-10-CM: E55.9 ICD-9-CM: 268.9 Dyslipidemia (high LDL; low HDL)     ICD-10-CM: E78.5 ICD-9-CM: 272.4 Acute renal failure with other specified pathological lesion in kidney [facility])     ICD-10-CM: N17.8 ICD-9-CM: 584.8 IGT (impaired glucose tolerance)     ICD-10-CM: R73.02 
ICD-9-CM: 790.22 Panic disorder     ICD-10-CM: F41.0 ICD-9-CM: 300.01 Screening for thyroid disorder     ICD-10-CM: Z13.29 ICD-9-CM: V77.0 Vitals BP Pulse Temp Resp Height(growth percentile) Weight(growth percentile) 136/89 72 97.1 °F (36.2 °C) (Oral) 20 5' 1\" (1.549 m) 161 lb (73 kg) LMP SpO2 BMI OB Status Smoking Status 03/30/2018 97% 30.42 kg/m2 Having regular periods Never Smoker Vitals History BMI and BSA Data Body Mass Index Body Surface Area  
 30.42 kg/m 2 1.77 m 2 Preferred Pharmacy Pharmacy Name Phone [address] [facility] [address], [address] [facility] [phone] Your Updated Medication List  
  
   
This list is accurate as of 4/9/18  2:54 PM.  Always use your most recent med list.  
  
  
  
  
 b complex vitamins tablet Take 1 Tab by mouth daily. LORazepam 0.5 mg tablet Commonly known as:  ATIVAN TK 1 T PO ONCE QD PRA  
  
 magnesium 250 mg Tab Take  by mouth. VITAMIN C 250 mg tablet Generic drug:  ascorbic acid (vitamin C) Take  by mouth. Prescriptions Printed Refills LORazepam (ATIVAN) 0.5 mg tablet 0 Sig: TK 1 T PO ONCE QD PRA Class: Print We Performed the Following CBC WITH AUTOMATED DIFF [22119 CPT(R)] HEMOGLOBIN A1C WITH EAG [89080 CPT(R)] LIPID PANEL [18044 CPT(R)] METABOLIC PANEL, COMPREHENSIVE [96259 CPT(R)] TSH 3RD GENERATION [96256 CPT(R)] VITAMIN D, 25 HYDROXY [id number] CPT(R)] Follow-up Instructions Return in about 4 months (around 8/9/2018), or if symptoms worsen or fail to improve, for routine follow up. Patient Instructions Well Visit, Ages 25 to 48: Care Instructions Your Care Instructions Physical exams can help you stay healthy. Your doctor has checked your overall health and may have suggested ways to take good care of yourself. He or she also may have recommended tests.  At home, you can help prevent illness with healthy eating, regular exercise, and other steps. Follow-up care is a key part of your treatment and safety. Be sure to make and go to all appointments, and call your doctor if you are having problems. It's also a good idea to know your test results and keep a list of the medicines you take. How can you care for yourself at home? · Reach and stay at a healthy weight. This will lower your risk for many problems, such as obesity, diabetes, heart disease, and high blood pressure. · Get at least 30 minutes of physical activity on most days of the week. Walking is a good choice. You also may want to do other activities, such as running, swimming, cycling, or playing tennis or team sports. Discuss any changes in your exercise program with your doctor. · Do not smoke or allow others to smoke around you. If you need help quitting, talk to your doctor about stop-smoking programs and medicines. These can increase your chances of quitting for good. · Talk to your doctor about whether you have any risk factors for sexually transmitted infections (STIs). Having one sex partner (who does not have STIs and does not have sex with anyone else) is a good way to avoid these infections. · Use birth control if you do not want to have children at this time. Talk with your doctor about the choices available and what might be best for you. · Protect your skin from too much sun. When you're outdoors from 10 a.m. to 4 p.m., stay in the shade or cover up with clothing and a hat with a wide brim. Wear sunglasses that block UV rays. Even when it's cloudy, put broad-spectrum sunscreen (SPF 30 or higher) on any exposed skin. · See a dentist one or two times a year for checkups and to have your teeth cleaned. · Wear a seat belt in the car. · Drink alcohol in moderation, if at all. That means no more than 2 drinks a day for men and 1 drink a day for women. Follow your doctor's advice about when to have certain tests. These tests can spot problems early. For everyone · Cholesterol. Have the fat (cholesterol) in your blood tested after age 21. Your doctor will tell you how often to have this done based on your age, family history, or other things that can increase your risk for heart disease. · Blood pressure. Have your blood pressure checked during a routine doctor visit. Your doctor will tell you how often to check your blood pressure based on your age, your blood pressure results, and other factors. · Vision. Talk with your doctor about how often to have a glaucoma test. 
· Diabetes. Ask your doctor whether you should have tests for diabetes. · Colon cancer. Have a test for colon cancer at age 48. You may have one of several tests. If you are younger than 48, you may need a test earlier if you have any risk factors. Risk factors include whether you already had a precancerous polyp removed from your colon or whether your parent, brother, sister, or child has had colon cancer. For women · Breast exam and mammogram. Talk to your doctor about when you should have a clinical breast exam and a mammogram. Medical experts differ on whether and how often women under 50 should have these tests. Your doctor can help you decide what is right for you. · Pap test and pelvic exam. Begin Pap tests at age 24. A Pap test is the best way to find cervical cancer. The test often is part of a pelvic exam. Ask how often to have this test. 
· Tests for sexually transmitted infections (STIs). Ask whether you should have tests for STIs. You may be at risk if you have sex with more than one person, especially if your partners do not wear condoms. For men · Tests for sexually transmitted infections (STIs). Ask whether you should have tests for STIs. You may be at risk if you have sex with more than one person, especially if you do not wear a condom. · Testicular cancer exam. Ask your doctor whether you should check your testicles regularly. · Prostate exam. Talk to your doctor about whether you should have a blood test (called a PSA test) for prostate cancer. Experts differ on whether and when men should have this test. Some experts suggest it if you are older than 39 and are -American or have a father or brother who got prostate cancer when he was younger than 72. When should you call for help? Watch closely for changes in your health, and be sure to contact your doctor if you have any problems or symptoms that concern you. Where can you learn more? Go to http://leanna-lee.info/. Enter P072 in the search box to learn more about \"Well Visit, Ages 25 to 48: Care Instructions. \" Current as of: May 12, 2017 Content Version: 11.4 © 8863-2348 [employer]. Care instructions adapted under license by [employer] (which disclaims liability or warranty for this information). If you have questions about a medical condition or this instruction, always ask your healthcare professional. [place] [address] any warranty or liability for your use of this information. Introducing \[facility] [place] [facility]\"" & HEALTH SERVICES! Dear Bolivar Favors: Thank you for requesting a [employer] account. Our records indicate that you already have an active [employer] account. You can access your account anytime at [email or web address] Did you know that you can access your hospital and ER discharge instructions at any time in [employer]? You can also review all of your test results from your hospital stay or ER visit. Additional Information If you have questions, please visit the Frequently Asked Questions section of the [employer] website at [email or web address] Remember, [employer] is NOT to be used for urgent needs. For medical emergencies, dial 911. Now available from your iPhone and Android! Please provide this summary of care documentation to your next provider. Your primary care clinician is listed as Jack Minor. If you have any questions after today's visit, please call [phone].

## 2018-08-10 ENCOUNTER — TELEPHONE (OUTPATIENT)
Dept: FAMILY MEDICINE CLINIC | Age: 48
End: 2018-08-10

## 2018-08-10 ENCOUNTER — OFFICE VISIT (OUTPATIENT)
Dept: FAMILY MEDICINE CLINIC | Age: 48
End: 2018-08-10

## 2018-08-10 VITALS
WEIGHT: 161 LBS | TEMPERATURE: 95.7 F | SYSTOLIC BLOOD PRESSURE: 128 MMHG | RESPIRATION RATE: 20 BRPM | BODY MASS INDEX: 30.4 KG/M2 | OXYGEN SATURATION: 99 % | HEIGHT: 61 IN | HEART RATE: 68 BPM | DIASTOLIC BLOOD PRESSURE: 93 MMHG

## 2018-08-10 DIAGNOSIS — R73.02 IGT (IMPAIRED GLUCOSE TOLERANCE): ICD-10-CM

## 2018-08-10 DIAGNOSIS — E78.5 DYSLIPIDEMIA (HIGH LDL; LOW HDL): Primary | ICD-10-CM

## 2018-08-10 DIAGNOSIS — E55.9 HYPOVITAMINOSIS D: ICD-10-CM

## 2018-08-10 NOTE — PATIENT INSTRUCTIONS
Learning About Low-Fat Eating  What is low-fat eating? Most food has some fat in it. Your body needs some fat to be healthy. But some kinds of fats are healthier than others. In a low-fat eating plan, you try to choose healthier fats and eat fewer unhealthy fats. Healthy fats include olive and canola oil. Try to avoid eating too much saturated fat (such as in cheese and meats) and trans fat (a type of fat found in many packaged snack foods and other baked goods). You do not need to cut all fat from your diet. But you can make healthier choices about the types and amount of fat you eat. Even though it is a good idea to choose healthier fats, it is still important to be careful of how much fat you eat, because all fats are high in calories. What are the different types of fats? Unhealthy fats  · Saturated fat. Saturated fats are mostly in animal foods, such as meat and dairy foods. Tropical oils, such as coconut oil, palm oil, and cocoa butter, are also saturated fats. · Trans fat. Trans fats include shortening, partially hydrogenated vegetable oils, and hydrogenated vegetable oils. Trans fats are made when a liquid fat is turned into a solid fat (for example, when corn oil is made into stick margarine). They are in many processed foods, such as cookies, crackers, and snack foods. Healthy fats  · Monounsaturated fat. Monounsaturated fats are liquid at room temperature but get solid when refrigerated. Eating foods that are high in this fat may help lower your \"bad\" (LDL) cholesterol, keep your \"good\" (HDL) cholesterol level up, and lower your chances of getting coronary artery disease. This fat is found in canola oil, olive oil, peanut oil, olives, avocados, nuts, and nut butters. · Polyunsaturated fat. Polyunsaturated fats are liquid at room temperature. They are in safflower, sunflower, and corn oils. They are also the main fat in seafood. Omega-3 fatty acids are types of polyunsaturated fat.  Eating fish may lower your chances of getting coronary artery disease. Fatty fish such as salmon and mackerel contain these healthy fatty acids. So do ground flaxseeds and flaxseed oil, soybeans, walnuts, and seeds. Why cut down on unhealthy fats? Eating foods that contain saturated fats can raise the LDL (\"bad\") cholesterol in your blood. Having a high level of LDL cholesterol increases your chance of hardening of the arteries (atherosclerosis), which can lead to heart disease, heart attack, and stroke. Trans fat raises the level of \"bad\" LDL cholesterol in your blood and may lower the \"good\" HDL cholesterol in your blood. Trans fat can raise your risk of heart disease, heart attack, and stroke. In general:  · No more than 10% of your daily calories should come from saturated fat. This is about 20 grams in a 2,000-calorie diet. · No more than 10% of your daily calories should come from polyunsaturated fat. This is about 20 grams in a 2,000-calorie diet. · Monounsaturated fats can be up to 15% of your daily calories. This is about 25 to 30 grams in a 2,000-calorie diet. If you're not sure how much fat you should be eating or how many calories you need each day to stay at a healthy weight, talk to a registered dietitian. He or she can help you create a plan that's right for you. What can you do to cut down on fat? Foods like cheese, butter, sausage, and desserts can have a lot of unhealthy fats. Try these tips for healthier meals at home and when you eat out. At home  · Fill up on fruits, vegetables, and whole grains. · Think of meat as a side dish instead of as the main part of your meal.  · When you do eat meat, make it extra-lean ground beef (97% lean), ground turkey breast (without skin added), meats with fat trimmed off before cooking, or skinless chicken. · Try main dishes that use whole wheat pasta, brown rice, dried beans, or vegetables.   · Use cooking methods that use little or no fat, such as broiling, steaming, or grilling. Use cooking spray instead of oil. If you use oil, use a monounsaturated oil, such as canola or olive oil. · Read food labels on canned, bottled, or packaged foods. Choose those with little saturated fat and no trans fat. When eating out at a restaurant  · Order foods that are broiled or poached instead of fried or breaded. · Cut back on the amount of butter or margarine that you use on bread. Use small amounts of olive oil instead. · Order sauces, gravies, and salad dressings on the side, and use only a little. · When you order pasta, choose tomato sauce instead of cream sauce. · Ask for salsa with your baked potato instead of sour cream, butter, cheese, or kate. Where can you learn more? Go to http://leanna-lee.info/. Enter N087 in the search box to learn more about \"Learning About Low-Fat Eating. \"  Current as of: May 12, 2017  Content Version: 11.7  © 1331-4038 [employer]. Care instructions adapted under license by [employer] (which disclaims liability or warranty for this information). If you have questions about a medical condition or this instruction, always ask your healthcare professional. [address] any warranty or liability for your use of this information.

## 2018-08-10 NOTE — PROGRESS NOTES
Chief Complaint   Patient presents with    Follow-up     HPI:  Kanchan Marroquin is a 51 y.o. AA female with high cholesterol managed on diet and exercise presents for 3 month follow up  Most recent blood work have reviewed and are within normal limits. Patient has no new complaints    She did her eye care, GI appointment for gerd with MIKE Correa has been set. Patient is still following physical therapy for  right shoulder pain. They are doing dry needle procedure    Review of Systems  As per hpi    Past Medical History:   Diagnosis Date    Anxiety     Chronic shoulder pain     Gastrointestinal disorder     GERD    GERD (gastroesophageal reflux disease)     Headache     Migraine headache     Neck pain, bilateral     Neurological disorder     migraines    Other ill-defined conditions(799.89)     migraines    Psychiatric disorder     anxiety     Past Surgical History:   Procedure Laterality Date    BREAST SURGERY PROCEDURE UNLISTED Left     Biopsy    HX GYN      cyst removed left ovary    HX HEENT      tonsillectomy    HX ORTHOPAEDIC      left toe arthroplasty    HX OTHER SURGICAL      left breast biopsy    HX OTHER SURGICAL      wisdom teeth    HX WISDOM TEETH EXTRACTION Bilateral      Social History     Social History    Marital status: SINGLE     Spouse name: N/A    Number of children: N/A    Years of education: N/A     Social History Main Topics    Smoking status: Never Smoker    Smokeless tobacco: Never Used    Alcohol use No    Drug use: No    Sexual activity: Yes     Partners: Female     Birth control/ protection: Condom     Other Topics Concern    None     Social History Narrative     Family History   Problem Relation Age of Onset    Cancer Mother      Pancreatic    Cancer Father      Lung    Other Brother      Gunshot     Current Outpatient Prescriptions   Medication Sig Dispense Refill    ascorbic acid, vitamin C, (VITAMIN C) 250 mg tablet Take  by mouth.       magnesium 250 mg tab Take  by mouth.  b complex vitamins tablet Take 1 Tab by mouth daily.  LORazepam (ATIVAN) 0.5 mg tablet TK 1 T PO ONCE QD PRA 30 Tab 0     Allergies   Allergen Reactions    Protonix [Pantoprazole] Swelling    Erythromycin Nausea and Vomiting    Doxycycline Nausea and Vomiting    Influenza Virus Vaccine [facility] 2012-13 (18+) Cell Prieto Unknown (comments)     Patient stated \"it effected my whole body. \"    Sulfa (Sulfonamide Antibiotics) Unknown (comments)     Pt states she cannot get sulfa due to it affecting her kidneys    Augmentin [Amoxicillin-Pot Clavulanate] Nausea Only     Objective:  Visit Vitals    BP (!) 128/93    Pulse 68    Temp 95.7 °F (35.4 °C) (Oral)    Resp 20    Ht 5' 1\" (1.549 m)    Wt 161 lb (73 kg)    LMP 06/01/2018  Comment: abnormal    SpO2 99%    BMI 30.42 kg/m2     Physical Exam:   General appearance - alert, well appearing in no distress  EYE-PERRL, EOMI  Neck - supple, no significant adenopathy   Chest - clear to auscultation, no wheezes, rales or rhonchi  Heart - normal rate, regular rhythm, normal   Abdomen - soft, nontender, nondistended, no organomegaly  Ext-peripheral pulses normal, no pedal edema  Neuro -alert, oriented, normal speech, no focal findings  Shoulder-reduced range of motion of on both sides. Assessment/Plan:  Diagnoses and all orders for this visit:    Dyslipidemia (high LDL; low HDL)    Hypovitaminosis D    IGT (impaired glucose tolerance)      Patient Instructions        Learning About Low-Fat Eating  What is low-fat eating? Most food has some fat in it. Your body needs some fat to be healthy. But some kinds of fats are healthier than others. In a low-fat eating plan, you try to choose healthier fats and eat fewer unhealthy fats. Healthy fats include olive and canola oil. Try to avoid eating too much saturated fat (such as in cheese and meats) and trans fat (a type of fat found in many packaged snack foods and other baked goods).   You do not need to cut all fat from your diet. But you can make healthier choices about the types and amount of fat you eat. Even though it is a good idea to choose healthier fats, it is still important to be careful of how much fat you eat, because all fats are high in calories. What are the different types of fats? Unhealthy fats  · Saturated fat. Saturated fats are mostly in animal foods, such as meat and dairy foods. Tropical oils, such as coconut oil, palm oil, and cocoa butter, are also saturated fats. · Trans fat. Trans fats include shortening, partially hydrogenated vegetable oils, and hydrogenated vegetable oils. Trans fats are made when a liquid fat is turned into a solid fat (for example, when corn oil is made into stick margarine). They are in many processed foods, such as cookies, crackers, and snack foods. Healthy fats  · Monounsaturated fat. Monounsaturated fats are liquid at room temperature but get solid when refrigerated. Eating foods that are high in this fat may help lower your \"bad\" (LDL) cholesterol, keep your \"good\" (HDL) cholesterol level up, and lower your chances of getting coronary artery disease. This fat is found in canola oil, olive oil, peanut oil, olives, avocados, nuts, and nut butters. · Polyunsaturated fat. Polyunsaturated fats are liquid at room temperature. They are in safflower, sunflower, and corn oils. They are also the main fat in seafood. Omega-3 fatty acids are types of polyunsaturated fat. Eating fish may lower your chances of getting coronary artery disease. Fatty fish such as salmon and mackerel contain these healthy fatty acids. So do ground flaxseeds and flaxseed oil, soybeans, walnuts, and seeds. Why cut down on unhealthy fats? Eating foods that contain saturated fats can raise the LDL (\"bad\") cholesterol in your blood.  Having a high level of LDL cholesterol increases your chance of hardening of the arteries (atherosclerosis), which can lead to heart disease, heart attack, and stroke. Trans fat raises the level of \"bad\" LDL cholesterol in your blood and may lower the \"good\" HDL cholesterol in your blood. Trans fat can raise your risk of heart disease, heart attack, and stroke. In general:  · No more than 10% of your daily calories should come from saturated fat. This is about 20 grams in a 2,000-calorie diet. · No more than 10% of your daily calories should come from polyunsaturated fat. This is about 20 grams in a 2,000-calorie diet. · Monounsaturated fats can be up to 15% of your daily calories. This is about 25 to 30 grams in a 2,000-calorie diet. If you're not sure how much fat you should be eating or how many calories you need each day to stay at a healthy weight, talk to a registered dietitian. He or she can help you create a plan that's right for you. What can you do to cut down on fat? Foods like cheese, butter, sausage, and desserts can have a lot of unhealthy fats. Try these tips for healthier meals at home and when you eat out. At home  · Fill up on fruits, vegetables, and whole grains. · Think of meat as a side dish instead of as the main part of your meal.  · When you do eat meat, make it extra-lean ground beef (97% lean), ground turkey breast (without skin added), meats with fat trimmed off before cooking, or skinless chicken. · Try main dishes that use whole wheat pasta, brown rice, dried beans, or vegetables. · Use cooking methods that use little or no fat, such as broiling, steaming, or grilling. Use cooking spray instead of oil. If you use oil, use a monounsaturated oil, such as canola or olive oil. · Read food labels on canned, bottled, or packaged foods. Choose those with little saturated fat and no trans fat. When eating out at a restaurant  · Order foods that are broiled or poached instead of fried or breaded. · Cut back on the amount of butter or margarine that you use on bread. Use small amounts of olive oil instead.   · Order sauces, gravies, and salad dressings on the side, and use only a little. · When you order pasta, choose tomato sauce instead of cream sauce. · Ask for salsa with your baked potato instead of sour cream, butter, cheese, or kate. Where can you learn more? Go to http://leanna-lee.info/. Enter I102 in the search box to learn more about \"Learning About Low-Fat Eating. \"  Current as of: May 12, 2017  Content Version: 11.7  © 6250-1654 [employer]. Care instructions adapted under license by [employer] (which disclaims liability or warranty for this information). If you have questions about a medical condition or this instruction, always ask your healthcare professional. [address] any warranty or liability for your use of this information. Follow-up Disposition:  Return in about 3 months (around 11/10/2018), or if symptoms worsen or fail to improve, for routine follow up.

## 2018-08-10 NOTE — TELEPHONE ENCOUNTER
----- Message from Brando Pineda sent at 8/10/2018  7:59 AM EDT -----  Regarding: Dr. Jennifer Dumont  Pt will arrive 10 -15 min late. attempted to warm transfer to office.  [phone]

## 2018-08-10 NOTE — MR AVS SNAPSHOT
[facility] [address] [facility] 
[phone] Patient: Evy Mccallum MRN: [id number] TPZ:[birth year] Visit Information Date & Time Provider Department Dept. Phone Encounter #  
 8/10/2018  7:45 AM Shelbi Pearce MD [address] at [address] [place] [address] [id number] Follow-up Instructions Return in about 3 months (around 11/10/2018), or if symptoms worsen or fail to improve, for routine follow up. Upcoming Health Maintenance Date Due Pneumococcal 19-64 Highest Risk (2 of 3 - PCV13) 5/10/2018 Influenza Age 5 to Adult 8/1/2018 PAP AKA CERVICAL CYTOLOGY 10/6/2020 DTaP/Tdap/Td series (2 - Td) 8/19/2026 Allergies as of 8/10/2018  Review Complete On: 8/10/2018 By: Shelbi Pearce MD  
  
 Severity Noted Reaction Type Reactions Protonix [Pantoprazole] High 06/06/2010   Systemic Swelling Erythromycin Medium 06/06/2010   Side Effect Nausea and Vomiting Doxycycline  11/16/2011    Nausea and Vomiting Influenza Virus Vaccine [facility] 2012-13 (18+) Cell Prieto  06/27/2016    Unknown (comments) Patient stated \"it effected my whole body. \"  
 Sulfa (Sulfonamide Antibiotics)  08/31/2016    Unknown (comments) Pt states she cannot get sulfa due to it affecting her kidneys Augmentin [Amoxicillin-pot Clavulanate] Low 06/06/2010   Side Effect Nausea Only Current Immunizations  Never Reviewed No immunizations on file. Not reviewed this visit You Were Diagnosed With   
  
 Codes Comments Dyslipidemia (high LDL; low HDL)    -  Primary ICD-10-CM: E78.5 ICD-9-CM: 272.4 Hypovitaminosis D     ICD-10-CM: E55.9 ICD-9-CM: 268.9 IGT (impaired glucose tolerance)     ICD-10-CM: R73.02 
ICD-9-CM: 790.22 Vitals BP Pulse Temp Resp Height(growth percentile) Weight(growth percentile) (!) 128/93 68 95.7 °F (35.4 °C) (Oral) 20 5' 1\" (1.549 m) 161 lb (73 kg) LMP SpO2 BMI OB Status Smoking Status 06/01/2018 99% 30.42 kg/m2 Having regular periods Never Smoker Vitals History BMI and BSA Data Body Mass Index Body Surface Area  
 30.42 kg/m 2 1.77 m 2 Preferred Pharmacy Pharmacy Name Phone [facility] [address] [facility] [[email or web address]] [phone] Your Updated Medication List  
  
   
This list is accurate as of 8/10/18  8:52 AM.  Always use your most recent med list.  
  
  
  
  
 b complex vitamins tablet Take 1 Tab by mouth daily. LORazepam 0.5 mg tablet Commonly known as:  ATIVAN TK 1 T PO ONCE QD PRA  
  
 magnesium 250 mg Tab Take  by mouth. VITAMIN C 250 mg tablet Generic drug:  ascorbic acid (vitamin C) Take  by mouth. Follow-up Instructions Return in about 3 months (around 11/10/2018), or if symptoms worsen or fail to improve, for routine follow up. Patient Instructions Learning About Low-Fat Eating What is low-fat eating? Most food has some fat in it. Your body needs some fat to be healthy. But some kinds of fats are healthier than others. In a low-fat eating plan, you try to choose healthier fats and eat fewer unhealthy fats. Healthy fats include olive and canola oil. Try to avoid eating too much saturated fat (such as in cheese and meats) and trans fat (a type of fat found in many packaged snack foods and other baked goods). You do not need to cut all fat from your diet. But you can make healthier choices about the types and amount of fat you eat. Even though it is a good idea to choose healthier fats, it is still important to be careful of how much fat you eat, because all fats are high in calories. What are the different types of fats? Unhealthy fats · Saturated fat. Saturated fats are mostly in animal foods, such as meat and dairy foods.  Tropical oils, such as coconut oil, palm oil, and cocoa butter, are also saturated fats. · Trans fat. Trans fats include shortening, partially hydrogenated vegetable oils, and hydrogenated vegetable oils. Trans fats are made when a liquid fat is turned into a solid fat (for example, when corn oil is made into stick margarine). They are in many processed foods, such as cookies, crackers, and snack foods. Healthy fats · Monounsaturated fat. Monounsaturated fats are liquid at room temperature but get solid when refrigerated. Eating foods that are high in this fat may help lower your \"bad\" (LDL) cholesterol, keep your \"good\" (HDL) cholesterol level up, and lower your chances of getting coronary artery disease. This fat is found in canola oil, olive oil, peanut oil, olives, avocados, nuts, and nut butters. · Polyunsaturated fat. Polyunsaturated fats are liquid at room temperature. They are in safflower, sunflower, and corn oils. They are also the main fat in seafood. Omega-3 fatty acids are types of polyunsaturated fat. Eating fish may lower your chances of getting coronary artery disease. Fatty fish such as salmon and mackerel contain these healthy fatty acids. So do ground flaxseeds and flaxseed oil, soybeans, walnuts, and seeds. Why cut down on unhealthy fats? Eating foods that contain saturated fats can raise the LDL (\"bad\") cholesterol in your blood. Having a high level of LDL cholesterol increases your chance of hardening of the arteries (atherosclerosis), which can lead to heart disease, heart attack, and stroke. Trans fat raises the level of \"bad\" LDL cholesterol in your blood and may lower the \"good\" HDL cholesterol in your blood. Trans fat can raise your risk of heart disease, heart attack, and stroke. In general: · No more than 10% of your daily calories should come from saturated fat. This is about 20 grams in a 2,000-calorie diet.  
· No more than 10% of your daily calories should come from polyunsaturated fat. This is about 20 grams in a 2,000-calorie diet. · Monounsaturated fats can be up to 15% of your daily calories. This is about 25 to 30 grams in a 2,000-calorie diet. If you're not sure how much fat you should be eating or how many calories you need each day to stay at a healthy weight, talk to a registered dietitian. He or she can help you create a plan that's right for you. What can you do to cut down on fat? Foods like cheese, butter, sausage, and desserts can have a lot of unhealthy fats. Try these tips for healthier meals at home and when you eat out. At home · Fill up on fruits, vegetables, and whole grains. · Think of meat as a side dish instead of as the main part of your meal. 
· When you do eat meat, make it extra-lean ground beef (97% lean), ground turkey breast (without skin added), meats with fat trimmed off before cooking, or skinless chicken. · Try main dishes that use whole wheat pasta, brown rice, dried beans, or vegetables. · Use cooking methods that use little or no fat, such as broiling, steaming, or grilling. Use cooking spray instead of oil. If you use oil, use a monounsaturated oil, such as canola or olive oil. · Read food labels on canned, bottled, or packaged foods. Choose those with little saturated fat and no trans fat. When eating out at a restaurant · Order foods that are broiled or poached instead of fried or breaded. · Cut back on the amount of butter or margarine that you use on bread. Use small amounts of olive oil instead. · Order sauces, gravies, and salad dressings on the side, and use only a little. · When you order pasta, choose tomato sauce instead of cream sauce. · Ask for salsa with your baked potato instead of sour cream, butter, cheese, or kate. Where can you learn more? Go to http://leanna-lee.info/. Enter E808 in the search box to learn more about \"Learning About Low-Fat Eating. \" Current as of: May 12, 2017 Content Version: 11.7 © 1784-1417 [employer], [employer]. Care instructions adapted under license by [employer] (which disclaims liability or warranty for this information). If you have questions about a medical condition or this instruction, always ask your healthcare professional. [address] any warranty or liability for your use of this information. Introducing [facility]! Dear Soo Yanes: Thank you for requesting a Sellvana account. Our records indicate that you already have an active Sellvana account. You can access your account anytime at [email or web address] Did you know that you can access your hospital and ER discharge instructions at any time in Sellvana? You can also review all of your test results from your hospital stay or ER visit. Additional Information If you have questions, please visit the Frequently Asked Questions section of the Sellvana website at [email or web address] Remember, Sellvana is NOT to be used for urgent needs. For medical emergencies, dial 911. Now available from your iPhone and Android! Please provide this summary of care documentation to your next provider. Your primary care clinician is listed as Gerald Horne. If you have any questions after today's visit, please call [phone].

## 2018-08-10 NOTE — PROGRESS NOTES
Chief Complaint   Patient presents with    Follow-up     Patient here today as a follow up . Pap screening due for 2019 per patient. Had a eye exam in July 2018. will be scheduled for a GI in Sept. And dermatology appt  In sept 2018.

## 2018-09-10 RX ORDER — FLUCONAZOLE 150 MG/1
150 TABLET ORAL DAILY
Qty: 1 TAB | Refills: 0 | Status: SHIPPED | OUTPATIENT
Start: 2018-09-10 | End: 2018-09-11

## 2018-09-10 NOTE — TELEPHONE ENCOUNTER
----- Message from Niki Pérez sent at 9/10/2018 10:08 AM EDT -----  Regarding: Dr. Amy Grande  Pt is requesting an Rx for Diflucan, due to a reoccurring yeast infection, and would like the Rx sent to [address] [place] [address] [id number], [phone]. Pt's best contact number [phone].

## 2018-11-13 ENCOUNTER — OFFICE VISIT (OUTPATIENT)
Dept: FAMILY MEDICINE CLINIC | Age: 48
End: 2018-11-13

## 2018-11-13 VITALS
OXYGEN SATURATION: 97 % | WEIGHT: 171 LBS | RESPIRATION RATE: 20 BRPM | BODY MASS INDEX: 32.28 KG/M2 | HEART RATE: 85 BPM | HEIGHT: 61 IN | TEMPERATURE: 96.9 F | DIASTOLIC BLOOD PRESSURE: 78 MMHG | SYSTOLIC BLOOD PRESSURE: 124 MMHG

## 2018-11-13 DIAGNOSIS — T36.95XA ANTIBIOTIC-INDUCED YEAST INFECTION: ICD-10-CM

## 2018-11-13 DIAGNOSIS — B37.9 ANTIBIOTIC-INDUCED YEAST INFECTION: ICD-10-CM

## 2018-11-13 DIAGNOSIS — R31.9 HEMATURIA, UNSPECIFIED TYPE: ICD-10-CM

## 2018-11-13 DIAGNOSIS — J01.00 SUBACUTE MAXILLARY SINUSITIS: Primary | ICD-10-CM

## 2018-11-13 DIAGNOSIS — N30.90 BLADDER INFECTION: ICD-10-CM

## 2018-11-13 LAB
BILIRUB UR QL STRIP: NEGATIVE
GLUCOSE UR-MCNC: NEGATIVE MG/DL
KETONES P FAST UR STRIP-MCNC: NEGATIVE MG/DL
PH UR STRIP: 6 [PH] (ref 4.6–8)
PROT UR QL STRIP: NEGATIVE
SP GR UR STRIP: 1.02 (ref 1–1.03)
UA UROBILINOGEN AMB POC: NORMAL (ref 0.2–1)
URINALYSIS CLARITY POC: CLEAR
URINALYSIS COLOR POC: YELLOW
URINE BLOOD POC: NORMAL
URINE LEUKOCYTES POC: NEGATIVE
URINE NITRITES POC: NEGATIVE

## 2018-11-13 RX ORDER — AMOXICILLIN AND CLAVULANATE POTASSIUM 500; 125 MG/1; MG/1
1 TABLET, FILM COATED ORAL EVERY 12 HOURS
Qty: 10 TAB | Refills: 0 | Status: SHIPPED | OUTPATIENT
Start: 2018-11-13 | End: 2018-11-18

## 2018-11-13 RX ORDER — FLUCONAZOLE 150 MG/1
150 TABLET ORAL DAILY
Qty: 1 TAB | Refills: 0 | Status: SHIPPED | OUTPATIENT
Start: 2018-11-13 | End: 2018-11-14

## 2018-11-13 RX ORDER — CLINDAMYCIN PHOSPHATE 10 UG/ML
LOTION TOPICAL
Refills: 11 | COMMUNITY
Start: 2018-10-04 | End: 2021-12-03

## 2018-11-13 RX ORDER — RANITIDINE 150 MG/1
CAPSULE ORAL
Refills: 11 | COMMUNITY
Start: 2018-11-09 | End: 2020-11-09 | Stop reason: ALTCHOICE

## 2018-11-13 RX ORDER — FLUOCINONIDE 0.5 MG/G
CREAM TOPICAL
Refills: 3 | COMMUNITY
Start: 2018-10-04 | End: 2021-12-03

## 2018-11-13 RX ORDER — MAG CARB/ALUMINUM HYDROX/ALGIN 237.5-254
SUSPENSION, ORAL (FINAL DOSE FORM) ORAL
Refills: 5 | COMMUNITY
Start: 2018-11-09 | End: 2021-12-03

## 2018-11-13 NOTE — PATIENT INSTRUCTIONS
Sinusitis: Care Instructions Your Care Instructions Sinusitis is an infection of the lining of the sinus cavities in your head. Sinusitis often follows a cold. It causes pain and pressure in your head and face. In most cases, sinusitis gets better on its own in 1 to 2 weeks. But some mild symptoms may last for several weeks. Sometimes antibiotics are needed. Follow-up care is a key part of your treatment and safety. Be sure to make and go to all appointments, and call your doctor if you are having problems. It's also a good idea to know your test results and keep a list of the medicines you take. How can you care for yourself at home? · Take an over-the-counter pain medicine, such as acetaminophen (Tylenol), ibuprofen (Advil, Motrin), or naproxen (Aleve). Read and follow all instructions on the label. · If the doctor prescribed antibiotics, take them as directed. Do not stop taking them just because you feel better. You need to take the full course of antibiotics. · Be careful when taking over-the-counter cold or flu medicines and Tylenol at the same time. Many of these medicines have acetaminophen, which is Tylenol. Read the labels to make sure that you are not taking more than the recommended dose. Too much acetaminophen (Tylenol) can be harmful. · Breathe warm, moist air from a steamy shower, a hot bath, or a sink filled with hot water. Avoid cold, dry air. Using a humidifier in your home may help. Follow the directions for cleaning the machine. · Use saline (saltwater) nasal washes to help keep your nasal passages open and wash out mucus and bacteria. You can buy saline nose drops at a grocery store or drugstore. Or you can make your own at home by adding 1 teaspoon of salt and 1 teaspoon of baking soda to 2 cups of distilled water. If you make your own, fill a bulb syringe with the solution, insert the tip into your nostril, and squeeze gently. [place] your nose. · Put a hot, wet towel or a warm gel pack on your face 3 or 4 times a day for 5 to 10 minutes each time. · Try a decongestant nasal spray like oxymetazoline (Afrin). Do not use it for more than 3 days in a row. Using it for more than 3 days can make your congestion worse. When should you call for help? Call your doctor now or seek immediate medical care if: 
  · You have new or worse swelling or redness in your face or around your eyes.  
  · You have a new or higher fever.  
 Watch closely for changes in your health, and be sure to contact your doctor if: 
  · You have new or worse facial pain.  
  · The mucus from your nose becomes thicker (like pus) or has new blood in it.  
  · You are not getting better as expected. Where can you learn more? Go to http://leanna-lee.info/. Enter G370 in the search box to learn more about \"Sinusitis: Care Instructions. \" Current as of: March 28, 2018 Content Version: 11.8 © 0045-5305 [employer]. Care instructions adapted under license by [employer] (which disclaims liability or warranty for this information). If you have questions about a medical condition or this instruction, always ask your healthcare professional. [address] any warranty or liability for your use of this information.

## 2018-11-13 NOTE — PROGRESS NOTES
Chief Complaint Patient presents with  Urinary Frequency  Follow-up 3 months HPI: 
Anna Yee is a 51 y.o. AA female with significant medical history listed below presents for 3 month follow up Patient is complaining of urinary frequency, no pain, fever/chills. Hypercholesterolemia is managed on diet and exercise Most recent blood work done at [address] [place] [address] and [facility] have been reviewed. Anemia shows improvement  Patient has no new complaints Patient did see her neurologist for migraine, neck and back pain Patient had her annual dermatology check up. Enlarged anterior neck nodes bilaterally reported. Review of Systems As per hpi Past Medical History:  
Diagnosis Date  Anxiety  Chronic shoulder pain  Gastrointestinal disorder GERD  GERD (gastroesophageal reflux disease)  Headache  Migraine headache  Neck pain, bilateral   
 Neurological disorder   
 migraines  Other ill-defined conditions(679.92) migraines  Psychiatric disorder   
 anxiety Past Surgical History:  
Procedure Laterality Date  BREAST SURGERY PROCEDURE UNLISTED Left Biopsy  HX GYN    
 cyst removed left ovary  HX HEENT    
 tonsillectomy  HX ORTHOPAEDIC    
 left toe arthroplasty  HX OTHER SURGICAL    
 left breast biopsy  HX OTHER SURGICAL    
 wisdom teeth  HX WISDOM TEETH EXTRACTION Bilateral   
 
Social History Socioeconomic History  Marital status: SINGLE Spouse name: Not on file  Number of children: Not on file  Years of education: Not on file  Highest education level: Not on file Social Needs  Financial resource strain: Not on file  Food insecurity - worry: Not on file  Food insecurity - inability: Not on file  Transportation needs - medical: Not on file  Transportation needs - non-medical: Not on file Occupational History  Not on file Tobacco Use  Smoking status: Never Smoker  Smokeless tobacco: Never Used Substance and Sexual Activity  Alcohol use: No  
 Drug use: No  
 Sexual activity: Yes  
  Partners: Female Birth control/protection: Condom Other Topics Concern  Not on file Social History Narrative  Not on file Family History Problem Relation Age of Onset  Cancer Mother Pancreatic  Cancer Father Lung  Other Brother Nimisha Current Outpatient Medications Medication Sig Dispense Refill  fluocinoNIDE (LIDEX) 0.05 % topical cream JACKIE EXT AA TID  FOR BITES AND ITCHING SPOTS ON SKIN  3  
 clindamycin (CLEOCIN T) 1 % lotion JACKIE EXT AA BID  11  
 LORazepam (ATIVAN) 0.5 mg tablet TK 1 T PO ONCE QD PRA 30 Tab 0  
 GAVISCON EXTRA STRENGTH 254-237.5 mg/5 mL susp TK 10 ML PO QID  5  
 raNITIdine hcl 150 mg capsule TK 1 C PO D  PRN  11  
 ascorbic acid, vitamin C, (VITAMIN C) 250 mg tablet Take  by mouth.  magnesium 250 mg tab Take  by mouth.  b complex vitamins tablet Take 1 Tab by mouth daily. Allergies Allergen Reactions  Protonix [Pantoprazole] Swelling  Erythromycin Nausea and Vomiting  Doxycycline Nausea and Vomiting  Influenza Virus Vaccine [facility] 2012-13 (18+) Cell Prieto Unknown (comments) Patient stated \"it effected my whole body. \"  
 Sulfa (Sulfonamide Antibiotics) Unknown (comments) Pt states she cannot get sulfa due to it affecting her kidneys  Augmentin [Amoxicillin-Pot Clavulanate] Nausea Only Objective: 
Visit Vitals /78 Pulse 85 Temp 96.9 °F (36.1 °C) (Oral) Resp 20 Ht 5' 1\" (1.549 m) Wt 171 lb (77.6 kg) LMP 10/21/2018 Comment: abnormal  
SpO2 97% BMI 32.31 kg/m² Physical Exam:  
General appearance - alert, well appearing in no distress EYE-PERRL, EOMI Neck - supple, palpable anterior adenopathy Chest - clear to auscultation, no wheezes, rales or rhonchi Heart - normal rate, regular rhythm, normal blood pressure Abdomen - soft, nontender, nondistended, no organomegaly Ext-peripheral pulses normal, no pedal edema Neuro -alert, oriented, normal speech, no focal findings Back-full range of motion, no tenderness, palpable spasm or pain on motion Results for orders placed or performed in visit on 11/13/18 AMB POC URINALYSIS DIP STICK AUTO W/ MICRO Result Value Ref Range Color (UA POC) Yellow Clarity (UA POC) Clear Glucose (UA POC) Negative Negative Bilirubin (UA POC) Negative Negative Ketones (UA POC) Negative Negative Specific gravity (UA POC) 1.025 1.001 - 1.035 Blood (UA POC) 2+ Negative pH (UA POC) 6.0 4.6 - 8.0 Protein (UA POC) Negative Negative Urobilinogen (UA POC) 0.2 mg/dL 0.2 - 1 Nitrites (UA POC) Negative Negative Leukocyte esterase (UA POC) Negative Negative Assessment/Plan: 
Diagnoses and all orders for this visit: 
 
Subacute maxillary sinusitis 
-     amoxicillin-clavulanate (AUGMENTIN) 500-125 mg per tablet; Take 1 Tab by mouth every twelve (12) hours for 5 days. , Normal, Disp-10 Tab, R-0 Bladder infection -     AMB POC URINALYSIS DIP STICK AUTO W/ MICRO 
-     amoxicillin-clavulanate (AUGMENTIN) 500-125 mg per tablet; Take 1 Tab by mouth every twelve (12) hours for 5 days. , Normal, Disp-10 Tab, R-0 Antibiotic-induced yeast infection 
-     fluconazole (DIFLUCAN) 150 mg tablet; Take 1 Tab by mouth daily for 1 day. FDA advises cautious prescribing of oral fluconazole in pregnancy. , Normal, Disp-1 Tab, R-0 Hematuria, unspecified type Other orders -     Cancel: JASBIR MAMMO BI SCREENING INCL CAD; Future Patient Instructions Sinusitis: Care Instructions Your Care Instructions Sinusitis is an infection of the lining of the sinus cavities in your head. Sinusitis often follows a cold. It causes pain and pressure in your head and face. In most cases, sinusitis gets better on its own in 1 to 2 weeks.  But some mild symptoms may last for several weeks. Sometimes antibiotics are needed. Follow-up care is a key part of your treatment and safety. Be sure to make and go to all appointments, and call your doctor if you are having problems. It's also a good idea to know your test results and keep a list of the medicines you take. How can you care for yourself at home? · Take an over-the-counter pain medicine, such as acetaminophen (Tylenol), ibuprofen (Advil, Motrin), or naproxen (Aleve). Read and follow all instructions on the label. · If the doctor prescribed antibiotics, take them as directed. Do not stop taking them just because you feel better. You need to take the full course of antibiotics. · Be careful when taking over-the-counter cold or flu medicines and Tylenol at the same time. Many of these medicines have acetaminophen, which is Tylenol. Read the labels to make sure that you are not taking more than the recommended dose. Too much acetaminophen (Tylenol) can be harmful. · Breathe warm, moist air from a steamy shower, a hot bath, or a sink filled with hot water. Avoid cold, dry air. Using a humidifier in your home may help. Follow the directions for cleaning the machine. · Use saline (saltwater) nasal washes to help keep your nasal passages open and wash out mucus and bacteria. You can buy saline nose drops at a grocery store or drugstore. Or you can make your own at home by adding 1 teaspoon of salt and 1 teaspoon of baking soda to 2 cups of distilled water. If you make your own, fill a bulb syringe with the solution, insert the tip into your nostril, and squeeze gently. Donta Brakeman your nose. · Put a hot, wet towel or a warm gel pack on your face 3 or 4 times a day for 5 to 10 minutes each time. · Try a decongestant nasal spray like oxymetazoline (Afrin). Do not use it for more than 3 days in a row. Using it for more than 3 days can make your congestion worse. When should you call for help? Call your doctor now or seek immediate medical care if: 
  · You have new or worse swelling or redness in your face or around your eyes.  
  · You have a new or higher fever.  
 Watch closely for changes in your health, and be sure to contact your doctor if: 
  · You have new or worse facial pain.  
  · The mucus from your nose becomes thicker (like pus) or has new blood in it.  
  · You are not getting better as expected. Where can you learn more? Go to http://leanna-lee.info/. Enter B727 in the search box to learn more about \"Sinusitis: Care Instructions. \" Current as of: March 28, 2018 Content Version: 11.8 © 4709-7923 [employer]. Care instructions adapted under license by [employer] (which disclaims liability or warranty for this information). If you have questions about a medical condition or this instruction, always ask your healthcare professional. [address] any warranty or liability for your use of this information. Follow-up Disposition: 
Return in about 3 months (around 2/13/2019), or 3-4 months, for routine follow up.

## 2018-11-17 PROBLEM — K21.9 GASTROESOPHAGEAL REFLUX DISEASE: Status: ACTIVE | Noted: 2018-11-17

## 2018-11-17 PROBLEM — K62.5 RECTAL HEMORRHAGE: Status: ACTIVE | Noted: 2018-11-17

## 2018-11-17 PROBLEM — G57.30 SUPERFICIAL PERONEAL NERVE NEUROPATHY: Status: ACTIVE | Noted: 2018-11-17

## 2018-11-17 PROBLEM — L21.9 SEBORRHEIC ECZEMA: Status: ACTIVE | Noted: 2018-11-17

## 2018-11-17 PROBLEM — J30.1 ALLERGIC RHINITIS DUE TO POLLEN: Status: ACTIVE | Noted: 2018-11-17

## 2018-11-17 PROBLEM — L73.9 FOLLICULITIS: Status: ACTIVE | Noted: 2018-11-17

## 2018-11-17 PROBLEM — E55.9 VITAMIN D DEFICIENCY: Status: ACTIVE | Noted: 2018-11-17

## 2018-11-17 PROBLEM — H02.9 LESION OF EYELID: Status: ACTIVE | Noted: 2018-11-17

## 2018-11-17 PROBLEM — M76.819 ANTERIOR TIBIALIS TENDINITIS: Status: ACTIVE | Noted: 2018-11-17

## 2018-11-17 PROBLEM — M79.606 PAIN OF LOWER EXTREMITY: Status: ACTIVE | Noted: 2018-11-17

## 2018-11-17 PROBLEM — M89.8X1 PAIN IN SCAPULA: Status: ACTIVE | Noted: 2018-11-17

## 2018-11-17 PROBLEM — H00.019 HORDEOLUM EXTERNUM: Status: ACTIVE | Noted: 2018-11-17

## 2018-11-17 PROBLEM — E66.9 OBESITY: Status: ACTIVE | Noted: 2018-11-17

## 2019-02-08 ENCOUNTER — TELEPHONE (OUTPATIENT)
Dept: FAMILY MEDICINE CLINIC | Age: 49
End: 2019-02-08

## 2019-02-08 NOTE — TELEPHONE ENCOUNTER
----- Message from Afshan Bridges sent at 2/7/2019  5:07 PM EST -----  Regarding: Dr. Luis M Salmon requesting a call back in regards to a letter that's needs to show that she's physically fit to work.  Pt best contact number is [phone]

## 2019-02-08 NOTE — TELEPHONE ENCOUNTER
Return patient's call regarding a letter stating in good health and physically fit. Patient have an appt on 2/12/2019 and can be discuss then.

## 2019-02-12 ENCOUNTER — OFFICE VISIT (OUTPATIENT)
Dept: FAMILY MEDICINE CLINIC | Age: 49
End: 2019-02-12

## 2019-02-12 VITALS
OXYGEN SATURATION: 98 % | DIASTOLIC BLOOD PRESSURE: 85 MMHG | RESPIRATION RATE: 20 BRPM | SYSTOLIC BLOOD PRESSURE: 142 MMHG | BODY MASS INDEX: 33.04 KG/M2 | HEIGHT: 61 IN | TEMPERATURE: 96.7 F | HEART RATE: 73 BPM | WEIGHT: 175 LBS

## 2019-02-12 DIAGNOSIS — B37.9 ANTIBIOTIC-INDUCED YEAST INFECTION: ICD-10-CM

## 2019-02-12 DIAGNOSIS — R03.0 ELEVATED BLOOD PRESSURE READING WITHOUT DIAGNOSIS OF HYPERTENSION: ICD-10-CM

## 2019-02-12 DIAGNOSIS — N89.8 VAGINAL DISCHARGE: ICD-10-CM

## 2019-02-12 DIAGNOSIS — N39.0 URINARY TRACT INFECTION WITHOUT HEMATURIA, SITE UNSPECIFIED: ICD-10-CM

## 2019-02-12 DIAGNOSIS — T36.95XA ANTIBIOTIC-INDUCED YEAST INFECTION: ICD-10-CM

## 2019-02-12 DIAGNOSIS — J01.00 SUBACUTE MAXILLARY SINUSITIS: Primary | ICD-10-CM

## 2019-02-12 LAB
BILIRUB UR QL STRIP: NEGATIVE
GLUCOSE UR-MCNC: NEGATIVE MG/DL
KETONES P FAST UR STRIP-MCNC: NEGATIVE MG/DL
PH UR STRIP: 6 [PH] (ref 4.6–8)
PROT UR QL STRIP: NEGATIVE
SP GR UR STRIP: 1 (ref 1–1.03)
UA UROBILINOGEN AMB POC: NORMAL (ref 0.2–1)
URINALYSIS CLARITY POC: CLEAR
URINALYSIS COLOR POC: YELLOW
URINE BLOOD POC: NORMAL
URINE LEUKOCYTES POC: NEGATIVE
URINE NITRITES POC: NEGATIVE

## 2019-02-12 RX ORDER — FLUCONAZOLE 150 MG/1
150 TABLET ORAL DAILY
Qty: 1 TAB | Refills: 0 | Status: SHIPPED | OUTPATIENT
Start: 2019-02-12 | End: 2019-02-13

## 2019-02-12 RX ORDER — CIPROFLOXACIN 500 MG/1
500 TABLET ORAL 2 TIMES DAILY
Qty: 20 TAB | Refills: 0 | Status: SHIPPED | OUTPATIENT
Start: 2019-02-12 | End: 2019-02-21 | Stop reason: ALTCHOICE

## 2019-02-12 NOTE — PATIENT INSTRUCTIONS
Sinusitis: Care Instructions  Your Care Instructions    Sinusitis is an infection of the lining of the sinus cavities in your head. Sinusitis often follows a cold. It causes pain and pressure in your head and face. In most cases, sinusitis gets better on its own in 1 to 2 weeks. But some mild symptoms may last for several weeks. Sometimes antibiotics are needed. Follow-up care is a key part of your treatment and safety. Be sure to make and go to all appointments, and call your doctor if you are having problems. It's also a good idea to know your test results and keep a list of the medicines you take. How can you care for yourself at home? · Take an over-the-counter pain medicine, such as acetaminophen (Tylenol), ibuprofen (Advil, Motrin), or naproxen (Aleve). Read and follow all instructions on the label. · If the doctor prescribed antibiotics, take them as directed. Do not stop taking them just because you feel better. You need to take the full course of antibiotics. · Be careful when taking over-the-counter cold or flu medicines and Tylenol at the same time. Many of these medicines have acetaminophen, which is Tylenol. Read the labels to make sure that you are not taking more than the recommended dose. Too much acetaminophen (Tylenol) can be harmful. · Breathe warm, moist air from a steamy shower, a hot bath, or a sink filled with hot water. Avoid cold, dry air. Using a humidifier in your home may help. Follow the directions for cleaning the machine. · Use saline (saltwater) nasal washes to help keep your nasal passages open and wash out mucus and bacteria. You can buy saline nose drops at a grocery store or drugstore. Or you can make your own at home by adding 1 teaspoon of salt and 1 teaspoon of baking soda to 2 cups of distilled water. If you make your own, fill a bulb syringe with the solution, insert the tip into your nostril, and squeeze gently. [place] your nose.   · Put a hot, wet towel or a warm gel pack on your face 3 or 4 times a day for 5 to 10 minutes each time. · Try a decongestant nasal spray like oxymetazoline (Afrin). Do not use it for more than 3 days in a row. Using it for more than 3 days can make your congestion worse. When should you call for help? Call your doctor now or seek immediate medical care if:    · You have new or worse swelling or redness in your face or around your eyes.     · You have a new or higher fever.    Watch closely for changes in your health, and be sure to contact your doctor if:    · You have new or worse facial pain.     · The mucus from your nose becomes thicker (like pus) or has new blood in it.     · You are not getting better as expected. Where can you learn more? Go to http://leanna-lee.info/. Enter P198 in the search box to learn more about \"Sinusitis: Care Instructions. \"  Current as of: March 27, 2018  Content Version: 11.9  © 5296-3991 [employer]. Care instructions adapted under license by [employer] (which disclaims liability or warranty for this information). If you have questions about a medical condition or this instruction, always ask your healthcare professional. [place] [address] any warranty or liability for your use of this information.

## 2019-02-12 NOTE — LETTER
NOTIFICATION RETURN TO WORK / SCHOOL 
 
2/12/2019 4:42 PM 
 
Ms. Christophe Yanes  [address] [facility] [address] [id number] To Whom It May Concern: 
 
Christophe Yanes is currently under the care of [facility] [place]. Please be informed that Harpreet Grewal has no communicable disease or medical diagnoses that will prevent her working with your Agency If there are questions or concerns please feel free to contact our office. Sincerely, Jeff Richmond MD

## 2019-02-12 NOTE — PROGRESS NOTES
Chief Complaint   Patient presents with    Sinus Infection     was seen  at [facility]     Bladder Infection    Letter for School/Work     HPI:  Chanelle Joy is a 51 y.o. AA female presents with high cholesterol managed on diet and exercise presents with c/o sinus Infection, was seen  at [facility].  She is also c/o bladder infection, she is also c/o vaginal irritation. Patient is requesting a letter for Work excuse.     Review of Systems  As per hpi    Past Medical History:   Diagnosis Date    Anxiety     Chronic shoulder pain     Gastrointestinal disorder     GERD    GERD (gastroesophageal reflux disease)     Headache     Migraine headache     Neck pain, bilateral     Neurological disorder     migraines    Other ill-defined conditions(339.89)     migraines    Psychiatric disorder     anxiety     Past Surgical History:   Procedure Laterality Date    BREAST SURGERY PROCEDURE UNLISTED Left     Biopsy    HX GYN      cyst removed left ovary    HX HEENT      tonsillectomy    HX ORTHOPAEDIC      left toe arthroplasty    HX OTHER SURGICAL      left breast biopsy    HX OTHER SURGICAL      wisdom teeth    HX WISDOM TEETH EXTRACTION Bilateral      Social History     Socioeconomic History    Marital status: SINGLE     Spouse name: Not on file    Number of children: Not on file    Years of education: Not on file    Highest education level: Not on file   Tobacco Use    Smoking status: Never Smoker    Smokeless tobacco: Never Used   Substance and Sexual Activity    Alcohol use: No    Drug use: No    Sexual activity: Yes     Partners: Female     Birth control/protection: Condom     Family History   Problem Relation Age of Onset    Cancer Mother         Pancreatic    Cancer Father         Lung    Other Brother         Gunshot     Current Outpatient Medications   Medication Sig Dispense Refill    fluocinoNIDE (LIDEX) 0.05 % topical cream JACKIE EXT AA TID  FOR BITES AND ITCHING SPOTS ON SKIN  3    clindamycin (CLEOCIN T) 1 % lotion JACKIE EXT AA BID  11    GAVISCON EXTRA STRENGTH 254-237.5 mg/5 mL susp TK 10 ML PO QID  5    raNITIdine hcl 150 mg capsule TK 1 C PO D  PRN  11    ascorbic acid, vitamin C, (VITAMIN C) 250 mg tablet Take  by mouth.  magnesium 250 mg tab Take  by mouth.  b complex vitamins tablet Take 1 Tab by mouth daily.  LORazepam (ATIVAN) 0.5 mg tablet TK 1 T PO ONCE QD PRA 30 Tab 0     Allergies   Allergen Reactions    Protonix [Pantoprazole] Swelling    Erythromycin Nausea and Vomiting    Doxycycline Nausea and Vomiting    Influenza Virus Vaccine [facility] 2012-13 (18+) Cell Prieto Unknown (comments)     Patient stated \"it effected my whole body. \"    Sulfa (Sulfonamide Antibiotics) Unknown (comments)     Pt states she cannot get sulfa due to it affecting her kidneys    Augmentin [Amoxicillin-Pot Clavulanate] Nausea Only     Objective:  Visit Vitals  /85   Pulse 73   Temp 96.7 °F (35.9 °C) (Oral)   Resp 20   Ht 5' 1\" (1.549 m)   Wt 175 lb (79.4 kg)   LMP 02/08/2019   SpO2 98%   BMI 33.07 kg/m²     Physical Exam:   General appearance - alert, well appearing in no distress  Mental status - alert, oriented to person, place, and time  EYE-PERRL, EOMI  Neck - supple, no significant adenopathy   Chest - clear to auscultation, no wheezes, rales or rhonchi   Heart - normal rate, regular rhythm, normal blood pressure  Abdomen - soft, nontender, nondistended, no organomegaly  Ext-peripheral pulses normal, no pedal edema  Neuro -alert, oriented, normal speech, no focal findings   Back-full range of motion, no tenderness, palpable spasm or pain on motion     Results for orders placed or performed in visit on 11/13/18   AMB POC URINALYSIS DIP STICK AUTO W/ MICRO   Result Value Ref Range    Color (UA POC) Yellow     Clarity (UA POC) Clear     Glucose (UA POC) Negative Negative    Bilirubin (UA POC) Negative Negative    Ketones (UA POC) Negative Negative    Specific gravity (UA POC) 1.025 1.001 - 1.035    Blood (UA POC) 2+ Negative    pH (UA POC) 6.0 4.6 - 8.0    Protein (UA POC) Negative Negative    Urobilinogen (UA POC) 0.2 mg/dL 0.2 - 1    Nitrites (UA POC) Negative Negative    Leukocyte esterase (UA POC) Negative Negative     Assessment/Plan:  Diagnoses and all orders for this visit:    Subacute maxillary sinusitis  -     ciprofloxacin HCl (CIPRO) 500 mg tablet; Take 1 Tab by mouth two (2) times a day for 10 days. , Normal, Disp-20 Tab, R-0    Urinary tract infection without hematuria, site unspecified  -     AMB POC URINALYSIS DIP STICK AUTO W/ MICRO  -     ciprofloxacin HCl (CIPRO) 500 mg tablet; Take 1 Tab by mouth two (2) times a day for 10 days. , Normal, Disp-20 Tab, R-0    Elevated blood pressure reading without diagnosis of hypertension    Vaginal discharge  -     NUSWAB VAGINITIS PLUS    Antibiotic-induced yeast infection  -     fluconazole (DIFLUCAN) 150 mg tablet; Take 1 Tab by mouth daily for 1 day. FDA advises cautious prescribing of oral fluconazole in pregnancy. , Normal, Disp-1 Tab, R-0      Patient Instructions          Sinusitis: Care Instructions  Your Care Instructions    Sinusitis is an infection of the lining of the sinus cavities in your head. Sinusitis often follows a cold. It causes pain and pressure in your head and face. In most cases, sinusitis gets better on its own in 1 to 2 weeks. But some mild symptoms may last for several weeks. Sometimes antibiotics are needed. Follow-up care is a key part of your treatment and safety. Be sure to make and go to all appointments, and call your doctor if you are having problems. It's also a good idea to know your test results and keep a list of the medicines you take. How can you care for yourself at home? · Take an over-the-counter pain medicine, such as acetaminophen (Tylenol), ibuprofen (Advil, Motrin), or naproxen (Aleve). Read and follow all instructions on the label.   · If the doctor prescribed antibiotics, take them as directed. Do not stop taking them just because you feel better. You need to take the full course of antibiotics. · Be careful when taking over-the-counter cold or flu medicines and Tylenol at the same time. Many of these medicines have acetaminophen, which is Tylenol. Read the labels to make sure that you are not taking more than the recommended dose. Too much acetaminophen (Tylenol) can be harmful. · Breathe warm, moist air from a steamy shower, a hot bath, or a sink filled with hot water. Avoid cold, dry air. Using a humidifier in your home may help. Follow the directions for cleaning the machine. · Use saline (saltwater) nasal washes to help keep your nasal passages open and wash out mucus and bacteria. You can buy saline nose drops at a grocery store or drugstore. Or you can make your own at home by adding 1 teaspoon of salt and 1 teaspoon of baking soda to 2 cups of distilled water. If you make your own, fill a bulb syringe with the solution, insert the tip into your nostril, and squeeze gently. [place] your nose. · Put a hot, wet towel or a warm gel pack on your face 3 or 4 times a day for 5 to 10 minutes each time. · Try a decongestant nasal spray like oxymetazoline (Afrin). Do not use it for more than 3 days in a row. Using it for more than 3 days can make your congestion worse. When should you call for help? Call your doctor now or seek immediate medical care if:    · You have new or worse swelling or redness in your face or around your eyes.     · You have a new or higher fever.    Watch closely for changes in your health, and be sure to contact your doctor if:    · You have new or worse facial pain.     · The mucus from your nose becomes thicker (like pus) or has new blood in it.     · You are not getting better as expected. Where can you learn more? Go to http://leanna-lee.info/. Enter S332 in the search box to learn more about \"Sinusitis: Care Instructions. \"  Current as of: March 27, 2018  Content Version: 11.9  © 6006-3698 [employer]. Care instructions adapted under license by [employer] (which disclaims liability or warranty for this information). If you have questions about a medical condition or this instruction, always ask your healthcare professional. [address] any warranty or liability for your use of this information. Follow-up Disposition:  Return in about 3 months (around 5/12/2019), or if symptoms worsen or fail to improve, for routine follow up.

## 2019-02-21 ENCOUNTER — TELEPHONE (OUTPATIENT)
Dept: FAMILY MEDICINE CLINIC | Age: 49
End: 2019-02-21

## 2019-02-21 DIAGNOSIS — J01.01 ACUTE RECURRENT MAXILLARY SINUSITIS: Primary | ICD-10-CM

## 2019-02-21 RX ORDER — AZITHROMYCIN 250 MG/1
TABLET, FILM COATED ORAL
Qty: 6 TAB | Refills: 0 | Status: SHIPPED | OUTPATIENT
Start: 2019-02-21 | End: 2019-02-26

## 2019-02-21 NOTE — TELEPHONE ENCOUNTER
----- Message from Grecia Seals sent at 2/21/2019 10:27 AM EST -----  Regarding: Dr. Kinney Fuelling  Pt calling stating she thinks the medication \"Cipro\" that she was put on is causing her to break out in hives on both hands mostly her fingers and her lower legs. Last time she took it was last night. Best contact [phone].

## 2019-05-13 ENCOUNTER — OFFICE VISIT (OUTPATIENT)
Dept: FAMILY MEDICINE CLINIC | Age: 49
End: 2019-05-13

## 2019-05-13 VITALS
DIASTOLIC BLOOD PRESSURE: 62 MMHG | OXYGEN SATURATION: 100 % | HEIGHT: 61 IN | BODY MASS INDEX: 31.91 KG/M2 | HEART RATE: 80 BPM | RESPIRATION RATE: 20 BRPM | WEIGHT: 169 LBS | TEMPERATURE: 96.8 F | SYSTOLIC BLOOD PRESSURE: 116 MMHG

## 2019-05-13 DIAGNOSIS — F41.0 PANIC DISORDER: ICD-10-CM

## 2019-05-13 DIAGNOSIS — R73.02 IGT (IMPAIRED GLUCOSE TOLERANCE): ICD-10-CM

## 2019-05-13 DIAGNOSIS — E55.9 HYPOVITAMINOSIS D: ICD-10-CM

## 2019-05-13 DIAGNOSIS — Z00.00 ENCOUNTER FOR ANNUAL PHYSICAL EXAM: Primary | ICD-10-CM

## 2019-05-13 DIAGNOSIS — Z13.29 SCREENING FOR THYROID DISORDER: ICD-10-CM

## 2019-05-13 DIAGNOSIS — Z12.11 COLON CANCER SCREENING: ICD-10-CM

## 2019-05-13 DIAGNOSIS — E78.5 DYSLIPIDEMIA (HIGH LDL; LOW HDL): ICD-10-CM

## 2019-05-13 DIAGNOSIS — R31.9 HEMATURIA, UNSPECIFIED TYPE: ICD-10-CM

## 2019-05-13 RX ORDER — LORAZEPAM 0.5 MG/1
TABLET ORAL
Qty: 30 TAB | Refills: 0 | Status: SHIPPED | OUTPATIENT
Start: 2019-05-13 | End: 2019-08-12 | Stop reason: SDUPTHER

## 2019-05-13 NOTE — PROGRESS NOTES
Chief Complaint Patient presents with  Follow-up 3 month HPI: 
Vicente Spears is a 47 y.o. AA female with significant pmhx listed below presents for 3 month follow up. Patient has been doing well. Blood pressure is at goal. She has no complaints today. Patient is due for an annual physical exam 
 
Review of Systems As per hpi Past Medical History:  
Diagnosis Date  Anxiety  Chronic shoulder pain  Gastrointestinal disorder GERD  GERD (gastroesophageal reflux disease)  Headache  Migraine headache  Neck pain, bilateral   
 Neurological disorder   
 migraines  Other ill-defined conditions(799.89) migraines  Psychiatric disorder   
 anxiety Past Surgical History:  
Procedure Laterality Date  BREAST SURGERY PROCEDURE UNLISTED Left Biopsy  HX GYN    
 cyst removed left ovary  HX HEENT    
 tonsillectomy  HX ORTHOPAEDIC    
 left toe arthroplasty  HX OTHER SURGICAL    
 left breast biopsy  HX OTHER SURGICAL    
 wisdom teeth  HX WISDOM TEETH EXTRACTION Bilateral   
 
Social History Socioeconomic History  Marital status: SINGLE Spouse name: Not on file  Number of children: Not on file  Years of education: Not on file  Highest education level: Not on file Tobacco Use  Smoking status: Never Smoker  Smokeless tobacco: Never Used Substance and Sexual Activity  Alcohol use: No  
 Drug use: No  
 Sexual activity: Yes  
  Partners: Female Birth control/protection: Condom Family History Problem Relation Age of Onset  Cancer Mother Pancreatic  Cancer Father Lung  Other Brother Gunshot Current Outpatient Medications Medication Sig Dispense Refill  LORazepam (ATIVAN) 0.5 mg tablet TK 1 T PO ONCE QD PRA 30 Tab 0  
 fluocinoNIDE (LIDEX) 0.05 % topical cream JACKIE EXT AA TID  FOR BITES AND ITCHING SPOTS ON SKIN  3  
  clindamycin (CLEOCIN T) 1 % lotion JACKIE EXT AA BID  11  
 GAVISCON EXTRA STRENGTH 254-237.5 mg/5 mL susp TK 10 ML PO QID  5  
 raNITIdine hcl 150 mg capsule TK 1 C PO D  PRN  11  
 ascorbic acid, vitamin C, (VITAMIN C) 250 mg tablet Take  by mouth.  magnesium 250 mg tab Take  by mouth.  b complex vitamins tablet Take 1 Tab by mouth daily. Allergies Allergen Reactions  Protonix [Pantoprazole] Swelling  Erythromycin Nausea and Vomiting  Doxycycline Nausea and Vomiting  Influenza Virus Vaccine Tvs 2012-13 (18+) Cell Prieto Unknown (comments) Patient stated \"it effected my whole body. \"  
 Sulfa (Sulfonamide Antibiotics) Unknown (comments) Pt states she cannot get sulfa due to it affecting her kidneys  Augmentin [Amoxicillin-Pot Clavulanate] Nausea Only Objective: 
Visit Vitals /62 Pulse 80 Temp 96.8 °F (36 °C) (Oral) Resp 20 Ht 5' 1\" (1.549 m) Wt 169 lb (76.7 kg) LMP 02/13/2019 SpO2 100% BMI 31.93 kg/m² Physical Exam:  
General appearance - alert, well appearing in no distress Mental status - alert, oriented to person, place, and time EYE-PERRL, EOMI Neck - supple, no significant adenopathy Chest - clear to auscultation, no wheezes, rales or rhonchi Heart - normal rate, regular rhythm, normal blood pressure Abdomen - soft, nontender, nondistended, no organomegaly Ext-peripheral pulses normal, no pedal edema Skin-Warm and dry. no hyperpigmentation or suspicious lesions Neuro -alert, oriented, normal speech, no focal findings Back-full range of motion, no tenderness, palpable spasm or pain on motion Results for orders placed or performed in visit on 02/12/19 NUSWAB VAGINITIS PLUS Result Value Ref Range Atopobium vaginae Low - 0 Score BVAB 2 Low - 0 Score Megasphaera 1 Low - 0 Score C. albicans, VINH Negative Negative C. glabrata, VINH Negative Negative T. vaginalis, VINH Negative Negative C. trachomatis, VINH Negative Negative N. gonorrhoeae, VINH Negative Negative AMB POC URINALYSIS DIP STICK AUTO W/ MICRO Result Value Ref Range Color (UA POC) Yellow Clarity (UA POC) Clear Glucose (UA POC) Negative Negative Bilirubin (UA POC) Negative Negative Ketones (UA POC) Negative Negative Specific gravity (UA POC) 1.005 1.001 - 1.035 Blood (UA POC) Trace Negative pH (UA POC) 6.0 4.6 - 8.0 Protein (UA POC) Negative Negative Urobilinogen (UA POC) 0.2 mg/dL 0.2 - 1 Nitrites (UA POC) Negative Negative Leukocyte esterase (UA POC) Negative Negative Assessment/Plan: 
Diagnoses and all orders for this visit: 1. Encounter for annual physical exam 
-     METABOLIC PANEL, COMPREHENSIVE 
-     CBC WITH AUTOMATED DIFF 2. Panic disorder -     LORazepam (ATIVAN) 0.5 mg tablet; TK 1 T PO ONCE QD PRA 3. Hypovitaminosis D 
-     VITAMIN D, 25 HYDROXY 4. Dyslipidemia (high LDL; low HDL) -     LIPID PANEL 
 
5. IGT (impaired glucose tolerance) 
-     HEMOGLOBIN A1C WITH EAG 6. Hematuria, unspecified type 
-     URINALYSIS W/ RFLX MICROSCOPIC 7. Screening for thyroid disorder 
-     TSH 3RD GENERATION 8. Colon cancer screening 
-     REFERRAL TO GASTROENTEROLOGY Patient Instructions Body Mass Index: Care Instructions Your Care Instructions Body mass index (BMI) can help you see if your weight is raising your risk for health problems. It uses a formula to compare how much you weigh with how tall you are. · A BMI lower than 18.5 is considered underweight. · A BMI between 18.5 and 24.9 is considered healthy. · A BMI between 25 and 29.9 is considered overweight. A BMI of 30 or higher is considered obese. If your BMI is in the normal range, it means that you have a lower risk for weight-related health problems.  If your BMI is in the overweight or obese range, you may be at increased risk for weight-related health problems, such as high blood pressure, heart disease, stroke, arthritis or joint pain, and diabetes. If your BMI is in the underweight range, you may be at increased risk for health problems such as fatigue, lower protection (immunity) against illness, muscle loss, bone loss, hair loss, and hormone problems. BMI is just one measure of your risk for weight-related health problems. You may be at higher risk for health problems if you are not active, you eat an unhealthy diet, or you drink too much alcohol or use tobacco products. Follow-up care is a key part of your treatment and safety. Be sure to make and go to all appointments, and call your doctor if you are having problems. It's also a good idea to know your test results and keep a list of the medicines you take. How can you care for yourself at home? · Practice healthy eating habits. This includes eating plenty of fruits, vegetables, whole grains, lean protein, and low-fat dairy. · If your doctor recommends it, get more exercise. Walking is a good choice. Bit by bit, increase the amount you walk every day. Try for at least 30 minutes on most days of the week. · Do not smoke. Smoking can increase your risk for health problems. If you need help quitting, talk to your doctor about stop-smoking programs and medicines. These can increase your chances of quitting for good. · Limit alcohol to 2 drinks a day for men and 1 drink a day for women. Too much alcohol can cause health problems. If you have a BMI higher than 25 · Your doctor may do other tests to check your risk for weight-related health problems. This may include measuring the distance around your waist. A waist measurement of more than 40 inches in men or 35 inches in women can increase the risk of weight-related health problems. · Talk with your doctor about steps you can take to stay healthy or improve your health.  You may need to make lifestyle changes to lose weight and stay healthy, such as changing your diet and getting regular exercise. If you have a BMI lower than 18.5 · Your doctor may do other tests to check your risk for health problems. · Talk with your doctor about steps you can take to stay healthy or improve your health. You may need to make lifestyle changes to gain or maintain weight and stay healthy, such as getting more healthy foods in your diet and doing exercises to build muscle. Where can you learn more? Go to http://leanna-lee.info/. Enter S176 in the search box to learn more about \"Body Mass Index: Care Instructions. \" Current as of: October 13, 2016 Content Version: 11.4 © 6242-5248 [employer]. Care instructions adapted under license by [employer] (which disclaims liability or warranty for this information). If you have questions about a medical condition or this instruction, always ask your healthcare professional. [place] [address] any warranty or liability for your use of this information. Follow-up and Dispositions · Return in about 3 months (around 8/13/2019), or if symptoms worsen or fail to improve, for routine follow up.

## 2019-05-13 NOTE — PATIENT INSTRUCTIONS
Body Mass Index: Care Instructions Your Care Instructions Body mass index (BMI) can help you see if your weight is raising your risk for health problems. It uses a formula to compare how much you weigh with how tall you are. · A BMI lower than 18.5 is considered underweight. · A BMI between 18.5 and 24.9 is considered healthy. · A BMI between 25 and 29.9 is considered overweight. A BMI of 30 or higher is considered obese. If your BMI is in the normal range, it means that you have a lower risk for weight-related health problems. If your BMI is in the overweight or obese range, you may be at increased risk for weight-related health problems, such as high blood pressure, heart disease, stroke, arthritis or joint pain, and diabetes. If your BMI is in the underweight range, you may be at increased risk for health problems such as fatigue, lower protection (immunity) against illness, muscle loss, bone loss, hair loss, and hormone problems. BMI is just one measure of your risk for weight-related health problems. You may be at higher risk for health problems if you are not active, you eat an unhealthy diet, or you drink too much alcohol or use tobacco products. Follow-up care is a key part of your treatment and safety. Be sure to make and go to all appointments, and call your doctor if you are having problems. It's also a good idea to know your test results and keep a list of the medicines you take. How can you care for yourself at home? · Practice healthy eating habits. This includes eating plenty of fruits, vegetables, whole grains, lean protein, and low-fat dairy. · If your doctor recommends it, get more exercise. Walking is a good choice. Bit by bit, increase the amount you walk every day. Try for at least 30 minutes on most days of the week. · Do not smoke. Smoking can increase your risk for health problems.  If you need help quitting, talk to your doctor about stop-smoking programs and medicines. These can increase your chances of quitting for good. · Limit alcohol to 2 drinks a day for men and 1 drink a day for women. Too much alcohol can cause health problems. If you have a BMI higher than 25 · Your doctor may do other tests to check your risk for weight-related health problems. This may include measuring the distance around your waist. A waist measurement of more than 40 inches in men or 35 inches in women can increase the risk of weight-related health problems. · Talk with your doctor about steps you can take to stay healthy or improve your health. You may need to make lifestyle changes to lose weight and stay healthy, such as changing your diet and getting regular exercise. If you have a BMI lower than 18.5 · Your doctor may do other tests to check your risk for health problems. · Talk with your doctor about steps you can take to stay healthy or improve your health. You may need to make lifestyle changes to gain or maintain weight and stay healthy, such as getting more healthy foods in your diet and doing exercises to build muscle. Where can you learn more? Go to http://leanna-lee.info/. Enter S176 in the search box to learn more about \"Body Mass Index: Care Instructions. \" Current as of: October 13, 2016 Content Version: 11.4 © 8475-1046 Healthwise, Incorporated. Care instructions adapted under license by [employer] (which disclaims liability or warranty for this information). If you have questions about a medical condition or this instruction, always ask your healthcare professional. [address] any warranty or liability for your use of this information.

## 2019-08-12 ENCOUNTER — OFFICE VISIT (OUTPATIENT)
Dept: FAMILY MEDICINE CLINIC | Age: 49
End: 2019-08-12

## 2019-08-12 VITALS
OXYGEN SATURATION: 98 % | BODY MASS INDEX: 31.95 KG/M2 | DIASTOLIC BLOOD PRESSURE: 74 MMHG | RESPIRATION RATE: 20 BRPM | TEMPERATURE: 97.4 F | HEART RATE: 80 BPM | SYSTOLIC BLOOD PRESSURE: 124 MMHG | HEIGHT: 61 IN | WEIGHT: 169.2 LBS

## 2019-08-12 DIAGNOSIS — F41.0 PANIC DISORDER: ICD-10-CM

## 2019-08-12 DIAGNOSIS — L02.224 BOIL OF GROIN: ICD-10-CM

## 2019-08-12 DIAGNOSIS — B37.9 YEAST INFECTION: Primary | ICD-10-CM

## 2019-08-12 DIAGNOSIS — F41.9 ANXIETY: ICD-10-CM

## 2019-08-12 DIAGNOSIS — N89.8 VAGINAL DISCHARGE: ICD-10-CM

## 2019-08-12 LAB
BILIRUB UR QL STRIP: NEGATIVE
GLUCOSE UR-MCNC: NEGATIVE MG/DL
KETONES P FAST UR STRIP-MCNC: NEGATIVE MG/DL
PH UR STRIP: 5.5 [PH] (ref 4.6–8)
PROT UR QL STRIP: NEGATIVE
SP GR UR STRIP: 1.03 (ref 1–1.03)
UA UROBILINOGEN AMB POC: NORMAL (ref 0.2–1)
URINALYSIS CLARITY POC: CLEAR
URINALYSIS COLOR POC: YELLOW
URINE BLOOD POC: NORMAL
URINE LEUKOCYTES POC: NEGATIVE
URINE NITRITES POC: NEGATIVE

## 2019-08-12 RX ORDER — METRONIDAZOLE 500 MG/1
500 TABLET ORAL 2 TIMES DAILY
Qty: 10 TAB | Refills: 0 | Status: SHIPPED | OUTPATIENT
Start: 2019-08-12 | End: 2019-08-17

## 2019-08-12 RX ORDER — LORAZEPAM 0.5 MG/1
TABLET ORAL
Qty: 30 TAB | Refills: 0 | Status: CANCELLED | OUTPATIENT
Start: 2019-08-12

## 2019-08-12 RX ORDER — LORAZEPAM 0.5 MG/1
TABLET ORAL
Qty: 30 TAB | Refills: 0 | Status: SHIPPED | OUTPATIENT
Start: 2019-08-12 | End: 2020-08-10 | Stop reason: SDUPTHER

## 2019-08-12 RX ORDER — FLUCONAZOLE 150 MG/1
150 TABLET ORAL DAILY
Qty: 2 TAB | Refills: 0 | Status: SHIPPED | OUTPATIENT
Start: 2019-08-12 | End: 2019-08-13

## 2019-08-12 NOTE — LETTER
8/12/2019 12:48 PM 
 
Ms. Paula Knowles [address] [facility] 7 [address] To whom it may concern: Ms. Paula Knowles is on Alprazolam as needed for diagnosis of Anxiety/Panic disorder. If there are any questions or concerns please feel free to contact our office. Sincerely, Sebas Castillo MD

## 2019-08-12 NOTE — PROGRESS NOTES
Chief Complaint   Patient presents with    Follow-up    Yeast Infection    Medication Refill     need medication  doctors note      HPI:  Barbara Helm is a 47 y.o. AA female presents for follow-up  Primary reason for this visit concern vaginal discharge thought to secondary yeast Infection;   Denies foul order but admits urinary frequency, no dysuria. Also, she says she traveling to [facility], she is requesting for Xanax refill and needs a doctor's note stating the reason she is taking medication.      Review of Systems  As per hpi    Past Medical History:   Diagnosis Date    Anxiety     Chronic shoulder pain     Gastrointestinal disorder     GERD    GERD (gastroesophageal reflux disease)     Headache     Migraine headache     Neck pain, bilateral     Neurological disorder     migraines    Other ill-defined conditions(799.89)     migraines    Psychiatric disorder     anxiety     Past Surgical History:   Procedure Laterality Date    BREAST SURGERY PROCEDURE UNLISTED Left     Biopsy    HX GYN      cyst removed left ovary    HX HEENT      tonsillectomy    HX ORTHOPAEDIC      left toe arthroplasty    HX OTHER SURGICAL      left breast biopsy    HX OTHER SURGICAL      wisdom teeth    HX WISDOM TEETH EXTRACTION Bilateral      Social History     Socioeconomic History    Marital status: SINGLE     Spouse name: Not on file    Number of children: Not on file    Years of education: Not on file    Highest education level: Not on file   Tobacco Use    Smoking status: Never Smoker    Smokeless tobacco: Never Used   Substance and Sexual Activity    Alcohol use: No    Drug use: No    Sexual activity: Yes     Partners: Female     Birth control/protection: Condom     Family History   Problem Relation Age of Onset    Cancer Mother         Pancreatic    Cancer Father         Lung    Other Brother         Gunshot     Current Outpatient Medications   Medication Sig Dispense Refill    LORazepam (ATIVAN) 0.5 mg tablet TK 1 T PO ONCE QD PRA 30 Tab 0    fluocinoNIDE (LIDEX) 0.05 % topical cream JACKIE EXT AA TID  FOR BITES AND ITCHING SPOTS ON SKIN  3    clindamycin (CLEOCIN T) 1 % lotion JACKIE EXT AA BID  11    GAVISCON EXTRA STRENGTH 254-237.5 mg/5 mL susp TK 10 ML PO QID  5    raNITIdine hcl 150 mg capsule TK 1 C PO D  PRN  11    ascorbic acid, vitamin C, (VITAMIN C) 250 mg tablet Take  by mouth.  magnesium 250 mg tab Take  by mouth.  b complex vitamins tablet Take 1 Tab by mouth daily. Allergies   Allergen Reactions    Protonix [Pantoprazole] Swelling    Erythromycin Nausea and Vomiting    Doxycycline Nausea and Vomiting    Influenza Virus Vaccine [facility] 2012-13 (18+) Cell Prieto Unknown (comments)     Patient stated \"it effected my whole body. \"    Sulfa (Sulfonamide Antibiotics) Unknown (comments)     Pt states she cannot get sulfa due to it affecting her kidneys    Augmentin [Amoxicillin-Pot Clavulanate] Nausea Only       Objective:  Visit Vitals  /74   Pulse 80   Temp 97.4 °F (36.3 °C)   Resp 20   Ht 5' 1\" (1.549 m)   Wt 169 lb 3.2 oz (76.7 kg)   LMP 01/12/2019   SpO2 98%   BMI 31.97 kg/m²     Physical Exam:   General appearance - alert, well appearing in no distress  Mental status - alert, oriented to person, place, and time  EYE-PERRL, EOMI  Chest - clear to auscultation, no wheezes, rales or rhonchi  Heart - normal rate, regular rhythm, normal blood pressure  Abdomen - soft, nontender, nondistended, no organomegaly  Ext-peripheral pulses normal, no pedal edema  Neuro - no focal findings   Groin tender induration on right   Pelvic exam: VAGINA: normal appearing vagina with normal color and discharge, no lesions.     Recent Results (from the past 12 hour(s))   AMB POC URINALYSIS DIP STICK AUTO W/ MICRO    Collection Time: 08/12/19 11:53 AM   Result Value Ref Range    Color (UA POC) Yellow     Clarity (UA POC) Clear     Glucose (UA POC) Negative Negative    Bilirubin (UA POC) Negative Negative Ketones (UA POC) Negative Negative    Specific gravity (UA POC) 1.030 1.001 - 1.035    Blood (UA POC) Trace Negative    pH (UA POC) 5.5 4.6 - 8.0    Protein (UA POC) Negative Negative    Urobilinogen (UA POC) 0.2 mg/dL 0.2 - 1    Nitrites (UA POC) Negative Negative    Leukocyte esterase (UA POC) Negative Negative     Assessment/Plan:  Diagnoses and all orders for this visit:    Yeast infection  -     AMB POC URINALYSIS DIP STICK AUTO W/ MICRO  -     fluconazole (DIFLUCAN) 150 mg tablet; Take 1 Tab by mouth daily for 1 day. FDA advises cautious prescribing of oral fluconazole in pregnancy. , Normal, Disp-2 Tab, R-0    Panic disorder  -     LORazepam (ATIVAN) 0.5 mg tablet; TK 1 T PO ONCE QD PRA, Print, Disp-30 Tab, R-0    Vaginal discharge  -     NUSWAB VAGINITIS PLUS  -     metroNIDAZOLE (FLAGYL) 500 mg tablet; Take 1 Tab by mouth two (2) times a day for 5 days. , Normal, Disp-10 Tab, R-0    Boil of groin  -     metroNIDAZOLE (FLAGYL) 500 mg tablet; Take 1 Tab by mouth two (2) times a day for 5 days. , Normal, Disp-10 Tab, R-0    Anxiety  -     LORazepam (ATIVAN) 0.5 mg tablet; TK 1 T PO ONCE QD PRA, Print, Disp-30 Tab, R-0    Other orders  -     Cancel: LORazepam (ATIVAN) 0.5 mg tablet; TK 1 T PO ONCE QD PRA, Normal, Disp-30 Tab, R-0      Patient Instructions     I will call with lab results    Follow-up and Dispositions    · Return in about 3 months (around 11/12/2019), or if symptoms worsen or fail to improve, for routine follow up.

## 2019-12-23 ENCOUNTER — TELEPHONE (OUTPATIENT)
Dept: FAMILY MEDICINE CLINIC | Age: 49
End: 2019-12-23

## 2019-12-23 NOTE — TELEPHONE ENCOUNTER
----- Message from Santhosh Ya sent at 12/23/2019  4:25 PM EST -----  Regarding: Dr Isidro Erickson first and last name:      Reason for call:    Pt is having yeast infection and UTI  symptoms.       Callback required yes/no and why:  yes no appt soon enough      Best contact number(s):  [phone]      Details to clarify the request:      Santhosh Ya

## 2019-12-24 NOTE — TELEPHONE ENCOUNTER
Called patient regarding going to [address] [facility] to be checked and not wait until jan's Appt. Patient agreed.

## 2020-08-10 ENCOUNTER — OFFICE VISIT (OUTPATIENT)
Dept: FAMILY MEDICINE CLINIC | Age: 50
End: 2020-08-10
Payer: COMMERCIAL

## 2020-08-10 VITALS
BODY MASS INDEX: 33 KG/M2 | TEMPERATURE: 97.7 F | HEIGHT: 61 IN | OXYGEN SATURATION: 99 % | HEART RATE: 84 BPM | WEIGHT: 174.8 LBS | DIASTOLIC BLOOD PRESSURE: 78 MMHG | RESPIRATION RATE: 20 BRPM | SYSTOLIC BLOOD PRESSURE: 126 MMHG

## 2020-08-10 DIAGNOSIS — E78.5 DYSLIPIDEMIA (HIGH LDL; LOW HDL): ICD-10-CM

## 2020-08-10 DIAGNOSIS — R73.02 IGT (IMPAIRED GLUCOSE TOLERANCE): ICD-10-CM

## 2020-08-10 DIAGNOSIS — F41.9 ANXIETY: ICD-10-CM

## 2020-08-10 DIAGNOSIS — E55.9 HYPOVITAMINOSIS D: ICD-10-CM

## 2020-08-10 DIAGNOSIS — R35.0 FREQUENCY OF URINATION: ICD-10-CM

## 2020-08-10 DIAGNOSIS — F41.0 PANIC DISORDER: ICD-10-CM

## 2020-08-10 DIAGNOSIS — Z13.29 SCREENING FOR THYROID DISORDER: ICD-10-CM

## 2020-08-10 DIAGNOSIS — Z00.00 ENCOUNTER FOR ANNUAL PHYSICAL EXAM: Primary | ICD-10-CM

## 2020-08-10 PROCEDURE — 99396 PREV VISIT EST AGE 40-64: CPT | Performed by: INTERNAL MEDICINE

## 2020-08-10 RX ORDER — LORAZEPAM 0.5 MG/1
TABLET ORAL
Qty: 30 TAB | Refills: 0 | Status: SHIPPED | OUTPATIENT
Start: 2020-08-10 | End: 2021-02-15 | Stop reason: SDUPTHER

## 2020-08-10 RX ORDER — FAMOTIDINE 10 MG/1
10 TABLET ORAL
COMMUNITY

## 2020-08-10 RX ORDER — BISMUTH SUBSALICYLATE 262 MG
1 TABLET,CHEWABLE ORAL
COMMUNITY

## 2020-08-10 NOTE — PROGRESS NOTES
Name and  verified        Chief Complaint   Patient presents with    Medication Refill       Patient stated last visit with [facility] 2020 and 2020

## 2020-08-10 NOTE — PATIENT INSTRUCTIONS

## 2020-08-10 NOTE — PROGRESS NOTES
Chief Complaint   Patient presents with    Medication Refill     HPI:  Gabi Perdomo is a 45 y.o. AA female with significant medical history listed below presents follow up for medication refill. Patient is also due annual physical exam. She has been doing well. Blood pressure is at goal. She has no complaints. She reports, she returned from [place] and [facility] just before the Pandemic. Patient is requesting refill on ativan    Review of Systems  As per hpi    Past Medical History:   Diagnosis Date    Anxiety     Chronic shoulder pain     Gastrointestinal disorder     GERD    GERD (gastroesophageal reflux disease)     Headache     Migraine headache     Neck pain, bilateral     Neurological disorder     migraines    Other ill-defined conditions(799.89)     migraines    Psychiatric disorder     anxiety     Past Surgical History:   Procedure Laterality Date    BREAST SURGERY PROCEDURE UNLISTED Left     Biopsy    HX GYN      cyst removed left ovary    HX HEENT      tonsillectomy    HX ORTHOPAEDIC      left toe arthroplasty    HX OTHER SURGICAL      left breast biopsy    HX OTHER SURGICAL      wisdom teeth    HX WISDOM TEETH EXTRACTION Bilateral      Social History     Socioeconomic History    Marital status: SINGLE     Spouse name: Not on file    Number of children: Not on file    Years of education: Not on file    Highest education level: Not on file   Tobacco Use    Smoking status: Never Smoker    Smokeless tobacco: Never Used   Substance and Sexual Activity    Alcohol use: No    Drug use: No    Sexual activity: Yes     Partners: Female     Birth control/protection: Condom     Family History   Problem Relation Age of Onset    Cancer Mother         Pancreatic    Cancer Father         Lung    Other Brother         Gunshot     Current Outpatient Medications   Medication Sig Dispense Refill    famotidine (Pepcid AC) 10 mg tablet Take 10 mg by mouth daily as needed for Heartburn.       multivitamin (ONE A DAY) tablet Take 1 Tab by mouth. Patient stated do not  Take everyday      fluocinoNIDE (LIDEX) 0.05 % topical cream JACKIE EXT AA TID  FOR BITES AND ITCHING SPOTS ON SKIN  3    ascorbic acid, vitamin C, (VITAMIN C) 250 mg tablet Take  by mouth.  LORazepam (ATIVAN) 0.5 mg tablet TK 1 T PO ONCE QD PRA 30 Tab 0    clindamycin (CLEOCIN T) 1 % lotion JACKIE EXT AA BID  11    GAVISCON EXTRA STRENGTH 254-237.5 mg/5 mL susp TK 10 ML PO QID  5    raNITIdine hcl 150 mg capsule TK 1 C PO D  PRN  11    magnesium 250 mg tab Take  by mouth.  b complex vitamins tablet Take 1 Tab by mouth daily. Allergies   Allergen Reactions    Protonix [Pantoprazole] Swelling    Erythromycin Nausea and Vomiting    Doxycycline Nausea and Vomiting    Influenza Virus Vaccine [facility] 2012-13 (18+) Cell Prieto Unknown (comments)     Patient stated \"it effected my whole body. \"    Sulfa (Sulfonamide Antibiotics) Unknown (comments)     Pt states she cannot get sulfa due to it affecting her kidneys    Augmentin [Amoxicillin-Pot Clavulanate] Nausea Only     Objective:  Visit Vitals  /78 (BP 1 Location: Left arm, BP Patient Position: At rest)   Pulse 84   Temp 97.7 °F (36.5 °C) (Temporal)   Resp 20   Ht 5' 1\" (1.549 m)   Wt 174 lb 12.8 oz (79.3 kg)   SpO2 99%   BMI 33.03 kg/m²     Physical Exam:   General appearance - alert, well appearing in no distress  Mental status - alert, oriented to person, place, and time  EYE-PERRL, EOMI  ENT-no neck nodes or sinus tenderness  Neck - supple, no thyromegaly, no significant adenopathy   Chest - clear to auscultation, no wheezes, rales or rhonchi  Heart - normal rate, regular rhythm, no murmurs  Abdomen - soft, nontender, nondistended, no organomegaly  Ext-peripheral pulses normal, no pedal edema  Skin-Warm and dry  Neuro -no focal findings or movement disorder noted  Back-full range of motion, no tenderness, palpable spasm or pain on motion     Results for orders placed or performed in visit on 08/12/19   NUSWAB VAGINITIS PLUS   Result Value Ref Range    Atopobium vaginae Low - 0 Score    BVAB 2 Low - 0 Score    Megasphaera 1 Low - 0 Score    C. albicans, VINH Negative Negative    C. glabrata, VINH Negative Negative    T. vaginalis, VINH Negative Negative    C. trachomatis, VINH Negative Negative    N. gonorrhoeae, VINH Negative Negative   AMB POC URINALYSIS DIP STICK AUTO W/ MICRO   Result Value Ref Range    Color (UA POC) Yellow     Clarity (UA POC) Clear     Glucose (UA POC) Negative Negative    Bilirubin (UA POC) Negative Negative    Ketones (UA POC) Negative Negative    Specific gravity (UA POC) 1.030 1.001 - 1.035    Blood (UA POC) Trace Negative    pH (UA POC) 5.5 4.6 - 8.0    Protein (UA POC) Negative Negative    Urobilinogen (UA POC) 0.2 mg/dL 0.2 - 1    Nitrites (UA POC) Negative Negative    Leukocyte esterase (UA POC) Negative Negative     Assessment/Plan:  Diagnoses and all orders for this visit:    Encounter for annual physical exam  -     METABOLIC PANEL, COMPREHENSIVE  -     CBC W/O DIFF    Panic disorder  -     LORazepam (ATIVAN) 0.5 mg tablet; TK 1 T PO ONCE QD PRA, Print, Disp-30 Tab,R-0    Anxiety  -     LORazepam (ATIVAN) 0.5 mg tablet; TK 1 T PO ONCE QD PRA, Print, Disp-30 Tab,R-0    Dyslipidemia (high LDL; low HDL)  -     LIPID PANEL    IGT (impaired glucose tolerance)  -     HEMOGLOBIN A1C WITH EAG    Screening for thyroid disorder  -     TSH 3RD GENERATION    Hypovitaminosis D  -     VITAMIN D, 25 HYDROXY    Frequency of urination  -     URINALYSIS W/ RFLX MICROSCOPIC      Patient Instructions        Anxiety Disorder: Care Instructions  Your Care Instructions     Anxiety is a normal reaction to stress. Difficult situations can cause you to have symptoms such as sweaty palms and a nervous feeling. In an anxiety disorder, the symptoms are far more severe.  Constant worry, muscle tension, trouble sleeping, nausea and diarrhea, and other symptoms can make normal daily activities difficult or impossible. These symptoms may occur for no reason, and they can affect your work, school, or social life. Medicines, counseling, and self-care can all help. Follow-up care is a key part of your treatment and safety. Be sure to make and go to all appointments, and call your doctor if you are having problems. It's also a good idea to know your test results and keep a list of the medicines you take. How can you care for yourself at home? · Take medicines exactly as directed. Call your doctor if you think you are having a problem with your medicine. · Go to your counseling sessions and follow-up appointments. · Recognize and accept your anxiety. Then, when you are in a situation that makes you anxious, say to yourself, \"This is not an emergency. I feel uncomfortable, but I am not in danger. I can keep going even if I feel anxious. \"  · Be kind to your body:  ? Relieve tension with exercise or a massage. ? Get enough rest.  ? Avoid alcohol, caffeine, nicotine, and illegal drugs. They can increase your anxiety level and cause sleep problems. ? Learn and do relaxation techniques. See below for more about these techniques. · Engage your mind. Get out and do something you enjoy. Go to a funny movie, or take a walk or hike. Plan your day. Having too much or too little to do can make you anxious. · Keep a record of your symptoms. Discuss your fears with a good friend or family member, or join a support group for people with similar problems. Talking to others sometimes relieves stress. · Get involved in social groups, or volunteer to help others. Being alone sometimes makes things seem worse than they are. · Get at least 30 minutes of exercise on most days of the week to relieve stress. Walking is a good choice. You also may want to do other activities, such as running, swimming, cycling, or playing tennis or team sports. Relaxation techniques  Do relaxation exercises 10 to 20 minutes a day. You can play soothing, relaxing music while you do them, if you wish. · Tell others in your house that you are going to do your relaxation exercises. Ask them not to disturb you. · Find a comfortable place, away from all distractions and noise. · Lie down on your back, or sit with your back straight. · Focus on your breathing. Make it slow and steady. · Breathe in through your nose. Breathe out through either your nose or mouth. · Breathe deeply, filling up the area between your navel and your rib cage. Breathe so that your belly goes up and down. · Do not hold your breath. · Breathe like this for 5 to 10 minutes. Notice the feeling of calmness throughout your whole body. As you continue to breathe slowly and deeply, relax by doing the following for another 5 to 10 minutes:  · Tighten and relax each muscle group in your body. You can begin at your toes and work your way up to your head. · Imagine your muscle groups relaxing and becoming heavy. · Empty your mind of all thoughts. · Let yourself relax more and more deeply. · Become aware of the state of calmness that surrounds you. · When your relaxation time is over, you can bring yourself back to alertness by moving your fingers and toes and then your hands and feet and then stretching and moving your entire body. Sometimes people fall asleep during relaxation, but they usually wake up shortly afterward. · Always give yourself time to return to full alertness before you drive a car or do anything that might cause an accident if you are not fully alert. Never play a relaxation tape while you drive a car. When should you call for help? YIHI324 anytime you think you may need emergency care. For example, call if:  · You feel you cannot stop from hurting yourself or someone else. Keep the numbers for these national suicide hotlines: 8-343-554-TALK (7-935.299.2904) and 8-561-WARYIZG (3-979.154.7519).  If you or someone you know talks about suicide or feeling hopeless, get help right away. Watch closely for changes in your health, and be sure to contact your doctor if:  · You have anxiety or fear that affects your life. · You have symptoms of anxiety that are new or different from those you had before. Where can you learn more? Go to http://leanna-lee.info/  Enter P754 in the search box to learn more about \"Anxiety Disorder: Care Instructions. \"  Current as of: January 31, 2020               Content Version: 12.5  © 5574-5416 Healthwise, Incorporated. Care instructions adapted under license by [employer] (which disclaims liability or warranty for this information). If you have questions about a medical condition or this instruction, always ask your healthcare professional. [address] any warranty or liability for your use of this information. Follow-up and Dispositions    · Return 3-4 months, for routine follow up.

## 2020-11-09 ENCOUNTER — OFFICE VISIT (OUTPATIENT)
Dept: FAMILY MEDICINE CLINIC | Age: 50
End: 2020-11-09
Payer: COMMERCIAL

## 2020-11-09 VITALS
RESPIRATION RATE: 20 BRPM | DIASTOLIC BLOOD PRESSURE: 80 MMHG | SYSTOLIC BLOOD PRESSURE: 130 MMHG | OXYGEN SATURATION: 99 % | TEMPERATURE: 97.8 F | HEIGHT: 61 IN | WEIGHT: 174 LBS | BODY MASS INDEX: 32.85 KG/M2 | HEART RATE: 85 BPM

## 2020-11-09 DIAGNOSIS — R35.0 FREQUENCY OF URINATION: Primary | ICD-10-CM

## 2020-11-09 DIAGNOSIS — E66.01 OBESITY, MORBID (HCC): ICD-10-CM

## 2020-11-09 DIAGNOSIS — E55.9 VITAMIN D DEFICIENCY: ICD-10-CM

## 2020-11-09 DIAGNOSIS — E55.9 HYPOVITAMINOSIS D: ICD-10-CM

## 2020-11-09 DIAGNOSIS — R35.0 FREQUENCY OF URINATION: ICD-10-CM

## 2020-11-09 PROCEDURE — 99214 OFFICE O/P EST MOD 30 MIN: CPT | Performed by: INTERNAL MEDICINE

## 2020-11-09 RX ORDER — MELATONIN
1000 DAILY
Qty: 90 TAB | Refills: 1 | Status: SHIPPED | OUTPATIENT
Start: 2020-11-09 | End: 2021-02-15

## 2020-11-09 NOTE — PATIENT INSTRUCTIONS
Frequent Urination: Care Instructions Your Care Instructions An urge to urinate frequently but usually passing only small amounts of urine is a common symptom of urinary problems, such as urinary tract infections. The bladder may become inflamed. This can cause the urge to urinate. You may try to urinate more often than usual to try to soothe that urge. Frequent urination also may be caused by sexually transmitted infections (STIs) or kidney stones. Or it may happen when something irritates the tube that carries urine from the bladder to the outside of the body (urethra). It may also be a sign of diabetes. The cause may be hard to find. You may need tests. Follow-up care is a key part of your treatment and safety. Be sure to make and go to all appointments, and call your doctor if you are having problems. It's also a good idea to know your test results and keep a list of the medicines you take. How can you care for yourself at home? · Drink extra water for the next day or two. This will help make the urine less concentrated. (If you have kidney, heart, or liver disease and have to limit fluids, talk with your doctor before you increase the amount of fluids you drink.) · Avoid drinks that are carbonated or have caffeine. They can irritate the bladder. For women: · Urinate right after you have sex. · After you go to the bathroom, wipe from front to back. · Avoid douches, bubble baths, and feminine hygiene sprays. And avoid other feminine hygiene products that have deodorants. When should you call for help? Call your doctor now or seek immediate medical care if: 
  · You have new symptoms, such as fever, nausea, or vomiting.  
  · You have new or worse symptoms of a urinary problem. For example: ? You have blood or pus in your urine. ? You have chills or body aches. ? It hurts to urinate. ? You have groin or belly pain. ? You have pain in your back just below your rib cage (the flank area). Watch closely for changes in your health, and be sure to contact your doctor if you feel thirstier than usual. 
Where can you learn more? Go to http://www.gray.com/ Enter 486 4764 in the search box to learn more about \"Frequent Urination: Care Instructions. \" Current as of: June 29, 2020               Content Version: 12.6 © 1066-2851 [employer]. Care instructions adapted under license by [employer] (which disclaims liability or warranty for this information). If you have questions about a medical condition or this instruction, always ask your healthcare professional. [place] [address] any warranty or liability for your use of this information.

## 2020-11-09 NOTE — LETTER
11/9/2020 Ignacio Obrien [address] [place] [address] [address] Dear Ignacio Obrien My records indicate that you are overdue for a follow up appointment. It is important to maintain your appointments so that I can monitor your health. Please call our office at [phone] to schedule an appointment. Sincerely, Shayy Navarrete MD 
[address] [facility] [address] [facility] 
[phone]

## 2020-11-09 NOTE — PROGRESS NOTES
Chief Complaint   Patient presents with    Follow-up     3 month  / Had mammogram  on 10/30/2020     HPI:  Buzz Conrad is a 45 y.o. AA female presents for 3 month follow-up. Had mammogram on 10/30/2020  Had a normal mammogram and pap smear 10/30/20 with her gyn. Asked patient to have documentation faxed to office. Reflux is stable on current treatment. Patient has no new complaints. Last lab results were stable except slightly low vit D level. Advised to continue supplements.     Review of Systems  As per hpi    Past Medical History:   Diagnosis Date    Anxiety     Chronic shoulder pain     Gastrointestinal disorder     GERD    GERD (gastroesophageal reflux disease)     Headache     Migraine headache     Neck pain, bilateral     Neurological disorder     migraines    Other ill-defined conditions(799.89)     migraines    Psychiatric disorder     anxiety     Past Surgical History:   Procedure Laterality Date    BREAST SURGERY PROCEDURE UNLISTED Left     Biopsy    HX GYN      cyst removed left ovary    HX HEENT      tonsillectomy    HX ORTHOPAEDIC      left toe arthroplasty    HX OTHER SURGICAL      left breast biopsy    HX OTHER SURGICAL      wisdom teeth    HX WISDOM TEETH EXTRACTION Bilateral      Social History     Socioeconomic History    Marital status: SINGLE     Spouse name: Not on file    Number of children: Not on file    Years of education: Not on file    Highest education level: Not on file   Tobacco Use    Smoking status: Never Smoker    Smokeless tobacco: Never Used   Substance and Sexual Activity    Alcohol use: No    Drug use: No    Sexual activity: Yes     Partners: Female     Birth control/protection: Condom     Family History   Problem Relation Age of Onset    Cancer Mother         Pancreatic    Cancer Father         Lung    Other Brother         Gunshot     Current Outpatient Medications   Medication Sig Dispense Refill    famotidine (Pepcid AC) 10 mg tablet Take 10 mg by mouth daily as needed for Heartburn.  multivitamin (ONE A DAY) tablet Take 1 Tab by mouth. Patient stated do not  Take everyday      LORazepam (ATIVAN) 0.5 mg tablet TK 1 T PO ONCE QD PRA 30 Tab 0    fluocinoNIDE (LIDEX) 0.05 % topical cream JACKIE EXT AA TID  FOR BITES AND ITCHING SPOTS ON SKIN  3    clindamycin (CLEOCIN T) 1 % lotion JACKIE EXT AA BID  11    GAVISCON EXTRA STRENGTH 254-237.5 mg/5 mL susp TK 10 ML PO QID  5    ascorbic acid, vitamin C, (VITAMIN C) 250 mg tablet Take  by mouth.  b complex vitamins tablet Take 1 Tab by mouth daily. Allergies   Allergen Reactions    Protonix [Pantoprazole] Swelling    Erythromycin Nausea and Vomiting    Doxycycline Nausea and Vomiting    Influenza Virus Vaccine [facility] 2012-13 (18+) Cell Prieto Unknown (comments)     Patient stated \"it effected my whole body. \"    Sulfa (Sulfonamide Antibiotics) Unknown (comments)     Pt states she cannot get sulfa due to it affecting her kidneys    Augmentin [Amoxicillin-Pot Clavulanate] Nausea Only     Objective:  Visit Vitals  /80   Pulse 85   Temp 97.8 °F (36.6 °C) (Temporal)   Resp 20   Ht 5' 1\" (1.549 m)   Wt 174 lb (78.9 kg)   SpO2 99%   BMI 32.88 kg/m²     Physical Exam:   General appearance - alert, well appearing in no distress  Mental status - alert, oriented to person, place, and time  EYE-PERRL, EOMI  ENT-ENT exam normal, no neck nodes or sinus tenderness  Neck - supple, no significant adenopathy   Chest - clear to auscultation, no wheezes, rales or rhonchi  Heart - normal rate, regular rhythm, no murmurs   Abdomen - soft, nontender, nondistended, no organomegaly  Ext-peripheral pulses normal, no pedal edema  Neuro -no focal findings   Back-full range of motion, no tenderness, palpable spasm or pain on motion     Results for orders placed or performed in visit on 08/12/19   NUSWAB VAGINITIS PLUS   Result Value Ref Range    Atopobium vaginae Low - 0 Score    BVAB 2 Low - 0 Score    Megasphaera 1 Low - 0 Score    C. albicans, VINH Negative Negative    C. glabrata, VINH Negative Negative    T. vaginalis, VINH Negative Negative    C. trachomatis, VINH Negative Negative    N. gonorrhoeae, VINH Negative Negative   AMB POC URINALYSIS DIP STICK AUTO W/ MICRO   Result Value Ref Range    Color (UA POC) Yellow     Clarity (UA POC) Clear     Glucose (UA POC) Negative Negative    Bilirubin (UA POC) Negative Negative    Ketones (UA POC) Negative Negative    Specific gravity (UA POC) 1.030 1.001 - 1.035    Blood (UA POC) Trace Negative    pH (UA POC) 5.5 4.6 - 8.0    Protein (UA POC) Negative Negative    Urobilinogen (UA POC) 0.2 mg/dL 0.2 - 1    Nitrites (UA POC) Negative Negative    Leukocyte esterase (UA POC) Negative Negative     Assessment/Plan:  Diagnoses and all orders for this visit:    Frequency of urination  -     URINALYSIS W/ RFLX MICROSCOPIC; Future    Obesity, morbid (HCC)    Hypovitaminosis D    Vitamin D deficiency  -     cholecalciferol (VITAMIN D3) (1000 Units /25 mcg) tablet; Take 1 Tab by mouth daily. , Print, Disp-90 Tab,R-1  -     calcium carbonate 500 mg calcium (1,250 mg) capsule; Take 1,250 mg by mouth daily. , Normal, Disp-30 Cap,R-3    Other orders  -     URINALYSIS W/ RFLX MICROSCOPIC      Patient Instructions          Frequent Urination: Care Instructions  Your Care Instructions  An urge to urinate frequently but usually passing only small amounts of urine is a common symptom of urinary problems, such as urinary tract infections. The bladder may become inflamed. This can cause the urge to urinate. You may try to urinate more often than usual to try to soothe that urge. Frequent urination also may be caused by sexually transmitted infections (STIs) or kidney stones. Or it may happen when something irritates the tube that carries urine from the bladder to the outside of the body (urethra). It may also be a sign of diabetes. The cause may be hard to find. You may need tests.   Follow-up care is a key part of your treatment and safety. Be sure to make and go to all appointments, and call your doctor if you are having problems. It's also a good idea to know your test results and keep a list of the medicines you take. How can you care for yourself at home? · Drink extra water for the next day or two. This will help make the urine less concentrated. (If you have kidney, heart, or liver disease and have to limit fluids, talk with your doctor before you increase the amount of fluids you drink.)  · Avoid drinks that are carbonated or have caffeine. They can irritate the bladder. For women:  · Urinate right after you have sex. · After you go to the bathroom, wipe from front to back. · Avoid douches, bubble baths, and feminine hygiene sprays. And avoid other feminine hygiene products that have deodorants. When should you call for help? Call your doctor now or seek immediate medical care if:    · You have new symptoms, such as fever, nausea, or vomiting.     · You have new or worse symptoms of a urinary problem. For example:  ? You have blood or pus in your urine. ? You have chills or body aches. ? It hurts to urinate. ? You have groin or belly pain. ? You have pain in your back just below your rib cage (the flank area). Watch closely for changes in your health, and be sure to contact your doctor if you feel thirstier than usual.  Where can you learn more? Go to http://www.gray.com/  Enter I431 in the search box to learn more about \"Frequent Urination: Care Instructions. \"  Current as of: June 29, 2020               Content Version: 12.6  © 6779-2730 [employer]. Care instructions adapted under license by [employer] (which disclaims liability or warranty for this information).  If you have questions about a medical condition or this instruction, always ask your healthcare professional. [place] [address] any warranty or liability for your use of this information. Follow-up and Dispositions    · Return in about 3 months (around 2/9/2021), or if symptoms worsen or fail to improve, for routine follow up.

## 2020-11-10 LAB
APPEARANCE UR: CLEAR
BILIRUB UR QL STRIP: NEGATIVE
COLOR UR: YELLOW
GLUCOSE UR QL: NEGATIVE
HGB UR QL STRIP: NEGATIVE
KETONES UR QL STRIP: ABNORMAL
LEUKOCYTE ESTERASE UR QL STRIP: NEGATIVE
MICRO URNS: ABNORMAL
NITRITE UR QL STRIP: NEGATIVE
PH UR STRIP: 6.5 [PH] (ref 5–7.5)
PROT UR QL STRIP: NEGATIVE
SP GR UR: 1.02 (ref 1–1.03)
UROBILINOGEN UR STRIP-MCNC: 0.2 MG/DL (ref 0.2–1)

## 2021-02-15 ENCOUNTER — OFFICE VISIT (OUTPATIENT)
Dept: FAMILY MEDICINE CLINIC | Age: 51
End: 2021-02-15
Payer: COMMERCIAL

## 2021-02-15 VITALS
OXYGEN SATURATION: 96 % | HEIGHT: 61 IN | DIASTOLIC BLOOD PRESSURE: 73 MMHG | WEIGHT: 173.2 LBS | BODY MASS INDEX: 32.7 KG/M2 | SYSTOLIC BLOOD PRESSURE: 133 MMHG | TEMPERATURE: 97.3 F | RESPIRATION RATE: 16 BRPM | HEART RATE: 87 BPM

## 2021-02-15 DIAGNOSIS — F41.0 PANIC DISORDER: ICD-10-CM

## 2021-02-15 DIAGNOSIS — Z12.11 COLON CANCER SCREENING: ICD-10-CM

## 2021-02-15 DIAGNOSIS — F41.9 ANXIETY: ICD-10-CM

## 2021-02-15 DIAGNOSIS — I10 HYPERTENSION GOAL BP (BLOOD PRESSURE) < 130/80: Primary | ICD-10-CM

## 2021-02-15 PROCEDURE — 99214 OFFICE O/P EST MOD 30 MIN: CPT | Performed by: INTERNAL MEDICINE

## 2021-02-15 RX ORDER — AMLODIPINE BESYLATE 2.5 MG/1
2.5 TABLET ORAL DAILY
Qty: 30 TAB | Refills: 3 | Status: SHIPPED | OUTPATIENT
Start: 2021-02-15 | End: 2021-12-03

## 2021-02-15 RX ORDER — LORAZEPAM 0.5 MG/1
TABLET ORAL
Qty: 30 TAB | Refills: 0 | Status: SHIPPED | OUTPATIENT
Start: 2021-02-15

## 2021-02-15 NOTE — PROGRESS NOTES
Chief Complaint   Patient presents with    Follow-up     3 month f/u     HPI:  Nancy Parr is a 45 y.o. AA female with significant medical history presents for 3 month follow up  Patient reports her blood pressure has rising. She was seen at [facility], started on amlodipine 5 mg. However, she takes only half of prescribed dosage when she can. Today, she she did not take treatment. Advised pt of medication compliance. Review of Systems  As per hpi    Past Medical History:   Diagnosis Date    Anxiety     Chronic shoulder pain     Gastrointestinal disorder     GERD    GERD (gastroesophageal reflux disease)     Headache     Migraine headache     Neck pain, bilateral     Neurological disorder     migraines    Other ill-defined conditions(799.89)     migraines    Psychiatric disorder     anxiety     Past Surgical History:   Procedure Laterality Date    HX GYN      cyst removed left ovary    HX HEENT      tonsillectomy    HX ORTHOPAEDIC      left toe arthroplasty    HX OTHER SURGICAL      left breast biopsy    HX OTHER SURGICAL      wisdom teeth    HX WISDOM TEETH EXTRACTION Bilateral     AK BREAST SURGERY PROCEDURE UNLISTED Left     Biopsy     Social History     Socioeconomic History    Marital status: SINGLE     Spouse name: Not on file    Number of children: Not on file    Years of education: Not on file    Highest education level: Not on file   Tobacco Use    Smoking status: Never Smoker    Smokeless tobacco: Never Used   Substance and Sexual Activity    Alcohol use: No    Drug use: No    Sexual activity: Yes     Partners: Female     Birth control/protection: Condom     Family History   Problem Relation Age of Onset    Cancer Mother         Pancreatic    Cancer Father         Lung    Other Brother         Gunshot     Current Outpatient Medications   Medication Sig Dispense Refill    famotidine (Pepcid AC) 10 mg tablet Take 10 mg by mouth daily as needed for Heartburn.       multivitamin (ONE A DAY) tablet Take 1 Tab by mouth. Patient stated do not  Take everyday      LORazepam (ATIVAN) 0.5 mg tablet TK 1 T PO ONCE QD PRA 30 Tab 0    fluocinoNIDE (LIDEX) 0.05 % topical cream JACKIE EXT AA TID  FOR BITES AND ITCHING SPOTS ON SKIN  3    clindamycin (CLEOCIN T) 1 % lotion JACKIE EXT AA BID  11    GAVISCON EXTRA STRENGTH 254-237.5 mg/5 mL susp TK 10 ML PO QID  5     Allergies   Allergen Reactions    Protonix [Pantoprazole] Swelling    Erythromycin Nausea and Vomiting    Doxycycline Nausea and Vomiting    Influenza Virus Vaccine [facility] 2012-13 (18+) Cell Prieto Unknown (comments)     Patient stated \"it effected my whole body. \"    Sulfa (Sulfonamide Antibiotics) Unknown (comments)     Pt states she cannot get sulfa due to it affecting her kidneys    Augmentin [Amoxicillin-Pot Clavulanate] Nausea Only       Objective:  Visit Vitals  /73 (BP 1 Location: Left upper arm, BP Patient Position: Sitting, BP Cuff Size: Adult)   Pulse 87   Temp 97.3 °F (36.3 °C) (Temporal)   Resp 16   Ht 5' 1\" (1.549 m)   Wt 173 lb 3.2 oz (78.6 kg)   SpO2 96%   BMI 32.73 kg/m²     Physical Exam:   General appearance - alert, well appearing in no distress  Mental status - alert, oriented to person, place, and time  Neck - supple, no significant adenopathy   Chest - clear to auscultation, no wheezes, rales or rhonchi  Heart - normal rate, regular rhythm, no murmurs  Abdomen - soft, nontender, nondistended, no organomegaly  Ext-peripheral pulses normal, no pedal edema  Neuro -no focal findings   Back-full range of motion, no tenderness, palpable spasm or pain on motion     Results for orders placed or performed in visit on 11/09/20   URINALYSIS W/ RFLX MICROSCOPIC   Result Value Ref Range    Specific Gravity 1.024 1.005 - 1.030    pH (UA) 6.5 5.0 - 7.5    Color Yellow Yellow    Appearance Clear Clear    Leukocyte Esterase Negative Negative    Protein Negative Negative/Trace    Glucose Negative Negative    Ketone Trace (A) Negative    Blood Negative Negative    Bilirubin Negative Negative    Urobilinogen 0.2 0.2 - 1.0 mg/dL    Nitrites Negative Negative    Microscopic Examination Comment      Assessment/Plan:  Diagnoses and all orders for this visit:    Hypertension goal BP (blood pressure) < 130/80  -     amLODIPine (NORVASC) 2.5 mg tablet; Take 1 Tab by mouth daily. , Normal, Disp-30 Tab, R-3    Panic disorder  -     LORazepam (ATIVAN) 0.5 mg tablet; TK 1 T PO ONCE QD PRA, Normal, Disp-30 Tab, R-0    Anxiety  -     LORazepam (ATIVAN) 0.5 mg tablet; TK 1 T PO ONCE QD PRA, Normal, Disp-30 Tab, R-0    Colon cancer screening  -     COLOGUARD TEST (FECAL DNA COLORECTAL CANCER SCREENING)      Patient Instructions          Colon Cancer Screening: Care Instructions  Your Care Instructions     Colorectal cancer occurs in the colon or rectum. That's the lower part of your digestive system. It is the second-leading cause of cancer deaths in the [place]. It often starts with small growths called polyps in the colon or rectum. Polyps are usually found with screening tests. Depending on the type of test, any polyps found may be removed during the tests. Colorectal cancer usually does not cause symptoms at first. But regular tests can help find it early, before it spreads and becomes harder to treat. Your risk for colorectal cancer gets higher as you get older. Some experts say that adults should start regular screening at age 48 and stop at age 76. Others say to start before age 48 or continue after age 76. Talk with your doctor about your risk and when to start and stop screening. You may have one of several tests. Follow-up care is a key part of your treatment and safety. Be sure to make and go to all appointments, and call your doctor if you are having problems. It's also a good idea to know your test results and keep a list of the medicines you take. What are the main screening tests for colon cancer?   The screening tests are:  Stool tests. These include the guaiac fecal occult blood test (gFOBT), the fecal immunochemical test (FIT), and the combined fecal immunochemical test and stool DNA test (FIT-DNA). These tests check stool samples for signs of cancer. If your test is positive, you will need to have a colonoscopy. Sigmoidoscopy. This test lets your doctor look at the lining of your rectum and the lowest part of your colon. Your doctor uses a lighted tube called a sigmoidoscope. This test can't find cancers or polyps in the upper part of your colon. In some cases, polyps that are found can be removed. But if your doctor finds polyps, you will need to have a colonoscopy to check the upper part of your colon. Colonoscopy. This test lets your doctor look at the lining of your rectum and your entire colon. The doctor uses a thin, flexible tool called a colonoscope. It can also be used to remove polyps or get a tissue sample (biopsy). A less common test is CT colonography (CTC). It's also called virtual colonoscopy. Who should be screened for colorectal cancer? Your risk for colorectal cancer gets higher as you get older. Some experts say that adults should start regular screening at age 48 and stop at age 76. Others say to start before age 48 or continue after age 76. Talk with your doctor about your risk and when to start and stop screening. How often you need screening depends on the type of test you get:  Stool tests. Every 1 or 2 years for FIT or gFOBT. Every 3 years for sDNA, also called FIT-DNA. Tests that look inside the colon. Every 5 or 10 years for sigmoidoscopy. Every 5 years for CT colonography (virtual colonoscopy). Every 10 years for colonoscopy. Experts agree that people at higher risk may need to be tested sooner. This includes people who have a strong family history of colon cancer. Talk to your doctor about which test is best for you and when to be tested.   When should you call for help?  Watch closely for changes in your health, and be sure to contact your doctor if:    · You have any changes in your bowel habits.     · You have any problems. Where can you learn more? Go to http://www.gray.com/  Enter M541 in the search box to learn more about \"Colon Cancer Screening: Care Instructions. \"  Current as of: April 29, 2020               Content Version: 12.6  © 2006-2020 [employer]. Care instructions adapted under license by [employer] (which disclaims liability or warranty for this information). If you have questions about a medical condition or this instruction, always ask your healthcare professional. [address] any warranty or liability for your use of this information. Follow-up and Dispositions    · Return in about 3 months (around 5/15/2021), or if symptoms worsen or fail to improve, for routine follow up.

## 2021-02-15 NOTE — PROGRESS NOTES
Chief Complaint   Patient presents with    Follow-up     3 month f/u         1. Have you been to the ER, urgent care clinic since your last visit? Hospitalized since your last visit? Yes When: [facility] 1/17 elevated bp     2. Have you seen or consulted any other health care providers outside of the [address] [place] [address] since your last visit? Include any pap smears or colon screening.  No

## 2021-02-15 NOTE — PATIENT INSTRUCTIONS
Colon Cancer Screening: Care Instructions Your Care Instructions Colorectal cancer occurs in the colon or rectum. That's the lower part of your digestive system. It is the second-leading cause of cancer deaths in the [place]. It often starts with small growths called polyps in the colon or rectum. Polyps are usually found with screening tests. Depending on the type of test, any polyps found may be removed during the tests. Colorectal cancer usually does not cause symptoms at first. But regular tests can help find it early, before it spreads and becomes harder to treat. Your risk for colorectal cancer gets higher as you get older. Some experts say that adults should start regular screening at age 48 and stop at age 76. Others say to start before age 48 or continue after age 76. Talk with your doctor about your risk and when to start and stop screening. You may have one of several tests. Follow-up care is a key part of your treatment and safety. Be sure to make and go to all appointments, and call your doctor if you are having problems. It's also a good idea to know your test results and keep a list of the medicines you take. What are the main screening tests for colon cancer? The screening tests are: 
Stool tests. These include the guaiac fecal occult blood test (gFOBT), the fecal immunochemical test (FIT), and the combined fecal immunochemical test and stool DNA test (FIT-DNA). These tests check stool samples for signs of cancer. If your test is positive, you will need to have a colonoscopy. Sigmoidoscopy. This test lets your doctor look at the lining of your rectum and the lowest part of your colon. Your doctor uses a lighted tube called a sigmoidoscope. This test can't find cancers or polyps in the upper part of your colon. In some cases, polyps that are found can be removed. But if your doctor finds polyps, you will need to have a colonoscopy to check the upper part of your colon. Colonoscopy. This test lets your doctor look at the lining of your rectum and your entire colon. The doctor uses a thin, flexible tool called a colonoscope. It can also be used to remove polyps or get a tissue sample (biopsy). A less common test is CT colonography (CTC). It's also called virtual colonoscopy. Who should be screened for colorectal cancer? Your risk for colorectal cancer gets higher as you get older. Some experts say that adults should start regular screening at age 48 and stop at age 76. Others say to start before age 48 or continue after age 76. Talk with your doctor about your risk and when to start and stop screening. How often you need screening depends on the type of test you get: 
Stool tests. Every 1 or 2 years for FIT or gFOBT. Every 3 years for sDNA, also called FIT-DNA. Tests that look inside the colon. Every 5 or 10 years for sigmoidoscopy. Every 5 years for CT colonography (virtual colonoscopy). Every 10 years for colonoscopy. Experts agree that people at higher risk may need to be tested sooner. This includes people who have a strong family history of colon cancer. Talk to your doctor about which test is best for you and when to be tested. When should you call for help? Watch closely for changes in your health, and be sure to contact your doctor if: 
  · You have any changes in your bowel habits.  
  · You have any problems. Where can you learn more? Go to http://www.gray.com/ Enter M541 in the search box to learn more about \"Colon Cancer Screening: Care Instructions. \" Current as of: April 29, 2020               Content Version: 12.6 © 6153-5302 [employer], [email or web address]. Care instructions adapted under license by [employer] (which disclaims liability or warranty for this information). If you have questions about a medical condition or this instruction, always ask your healthcare professional. [address] any warranty or liability for your use of this information.

## 2021-03-01 LAB — COLOGUARD TEST, EXTERNAL: NEGATIVE

## 2021-03-03 ENCOUNTER — DOCUMENTATION ONLY (OUTPATIENT)
Dept: FAMILY MEDICINE CLINIC | Age: 51
End: 2021-03-03

## 2021-12-03 ENCOUNTER — HOSPITAL ENCOUNTER (EMERGENCY)
Age: 51
Discharge: HOME | End: 2021-12-03
Attending: EMERGENCY MEDICINE
Payer: MEDICAID

## 2021-12-03 ENCOUNTER — APPOINTMENT (OUTPATIENT)
Dept: GENERAL RADIOLOGY | Age: 51
End: 2021-12-03
Attending: EMERGENCY MEDICINE
Payer: MEDICAID

## 2021-12-03 VITALS
WEIGHT: 158 LBS | SYSTOLIC BLOOD PRESSURE: 138 MMHG | BODY MASS INDEX: 29.83 KG/M2 | OXYGEN SATURATION: 98 % | HEART RATE: 58 BPM | HEIGHT: 61 IN | RESPIRATION RATE: 18 BRPM | TEMPERATURE: 98.6 F | DIASTOLIC BLOOD PRESSURE: 82 MMHG

## 2021-12-03 DIAGNOSIS — R07.89 CHEST WALL PAIN: ICD-10-CM

## 2021-12-03 DIAGNOSIS — H57.89 IRRITATION OF LEFT EYE: ICD-10-CM

## 2021-12-03 DIAGNOSIS — R07.9 CHEST PAIN, UNSPECIFIED TYPE: Primary | ICD-10-CM

## 2021-12-03 LAB
ALBUMIN SERPL-MCNC: 4 G/DL (ref 3.5–5)
ALBUMIN/GLOB SERPL: 1.1 {RATIO} (ref 1.1–2.2)
ALP SERPL-CCNC: 72 U/L (ref 45–117)
ALT SERPL-CCNC: 20 U/L (ref 12–78)
ANION GAP SERPL CALC-SCNC: 8 MMOL/L (ref 5–15)
APPEARANCE UR: CLEAR
AST SERPL W P-5'-P-CCNC: 14 U/L (ref 15–37)
ATRIAL RATE: 61 BPM
BASOPHILS # BLD: 0 K/UL (ref 0–0.1)
BASOPHILS NFR BLD: 0 % (ref 0–1)
BILIRUB SERPL-MCNC: 0.3 MG/DL (ref 0.2–1)
BILIRUB UR QL: NEGATIVE
BUN SERPL-MCNC: 20 MG/DL (ref 6–20)
BUN/CREAT SERPL: 15 (ref 12–20)
CA-I BLD-MCNC: 9.2 MG/DL (ref 8.5–10.1)
CALCULATED P AXIS, ECG09: 49 DEGREES
CALCULATED R AXIS, ECG10: 42 DEGREES
CALCULATED T AXIS, ECG11: 31 DEGREES
CHLORIDE SERPL-SCNC: 105 MMOL/L (ref 97–108)
CO2 SERPL-SCNC: 30 MMOL/L (ref 21–32)
COLOR UR: YELLOW
CREAT SERPL-MCNC: 1.36 MG/DL (ref 0.55–1.02)
DIAGNOSIS, 93000: NORMAL
DIFFERENTIAL METHOD BLD: NORMAL
EOSINOPHIL # BLD: 0 K/UL (ref 0–0.4)
EOSINOPHIL NFR BLD: 0 % (ref 0–7)
ERYTHROCYTE [DISTWIDTH] IN BLOOD BY AUTOMATED COUNT: 13.6 % (ref 11.5–14.5)
GLOBULIN SER CALC-MCNC: 3.7 G/DL (ref 2–4)
GLUCOSE SERPL-MCNC: 99 MG/DL (ref 65–100)
GLUCOSE UR STRIP.AUTO-MCNC: NEGATIVE MG/DL
HCT VFR BLD AUTO: 39.5 % (ref 35–47)
HGB BLD-MCNC: 12.8 G/DL (ref 11.5–16)
HGB UR QL STRIP: NEGATIVE
IMM GRANULOCYTES # BLD AUTO: 0 K/UL (ref 0–0.04)
IMM GRANULOCYTES NFR BLD AUTO: 0 % (ref 0–0.5)
KETONES UR QL STRIP.AUTO: NEGATIVE MG/DL
LEUKOCYTE ESTERASE UR QL STRIP.AUTO: NEGATIVE
LYMPHOCYTES # BLD: 1.5 K/UL (ref 0.8–3.5)
LYMPHOCYTES NFR BLD: 24 % (ref 12–49)
MCH RBC QN AUTO: 27.6 PG (ref 26–34)
MCHC RBC AUTO-ENTMCNC: 32.4 G/DL (ref 30–36.5)
MCV RBC AUTO: 85.1 FL (ref 80–99)
MONOCYTES # BLD: 0.3 K/UL (ref 0–1)
MONOCYTES NFR BLD: 5 % (ref 5–13)
NEUTS SEG # BLD: 4.3 K/UL (ref 1.8–8)
NEUTS SEG NFR BLD: 71 % (ref 32–75)
NITRITE UR QL STRIP.AUTO: NEGATIVE
P-R INTERVAL, ECG05: 144 MS
PH UR STRIP: 5 [PH] (ref 5–8)
PLATELET # BLD AUTO: 248 K/UL (ref 150–400)
PMV BLD AUTO: 11 FL (ref 8.9–12.9)
POTASSIUM SERPL-SCNC: 3.9 MMOL/L (ref 3.5–5.1)
PROT SERPL-MCNC: 7.7 G/DL (ref 6.4–8.2)
PROT UR STRIP-MCNC: NEGATIVE MG/DL
Q-T INTERVAL, ECG07: 416 MS
QRS DURATION, ECG06: 82 MS
QTC CALCULATION (BEZET), ECG08: 418 MS
RBC # BLD AUTO: 4.64 M/UL (ref 3.8–5.2)
SODIUM SERPL-SCNC: 143 MMOL/L (ref 136–145)
SP GR UR REFRACTOMETRY: 1.01 (ref 1–1.03)
TROPONIN-HIGH SENSITIVITY: 5 NG/L (ref 0–51)
TROPONIN-HIGH SENSITIVITY: 5 NG/L (ref 0–51)
UROBILINOGEN UR QL STRIP.AUTO: 0.1 EU/DL (ref 0.2–1)
VENTRICULAR RATE, ECG03: 61 BPM
WBC # BLD AUTO: 6.1 K/UL (ref 3.6–11)

## 2021-12-03 PROCEDURE — 81003 URINALYSIS AUTO W/O SCOPE: CPT

## 2021-12-03 PROCEDURE — 93005 ELECTROCARDIOGRAM TRACING: CPT

## 2021-12-03 PROCEDURE — 84484 ASSAY OF TROPONIN QUANT: CPT

## 2021-12-03 PROCEDURE — 80053 COMPREHEN METABOLIC PANEL: CPT

## 2021-12-03 PROCEDURE — 85025 COMPLETE CBC W/AUTO DIFF WBC: CPT

## 2021-12-03 PROCEDURE — 71045 X-RAY EXAM CHEST 1 VIEW: CPT

## 2021-12-03 PROCEDURE — 99284 EMERGENCY DEPT VISIT MOD MDM: CPT

## 2021-12-03 PROCEDURE — 36415 COLL VENOUS BLD VENIPUNCTURE: CPT

## 2021-12-03 PROCEDURE — 99283 EMERGENCY DEPT VISIT LOW MDM: CPT

## 2021-12-03 RX ORDER — LIDOCAINE HYDROCHLORIDE 20 MG/ML
5 SOLUTION OROPHARYNGEAL AS NEEDED
COMMUNITY

## 2021-12-03 RX ORDER — LANSOPRAZOLE 30 MG/1
30 CAPSULE, DELAYED RELEASE ORAL
COMMUNITY

## 2021-12-03 RX ORDER — METOPROLOL TARTRATE 25 MG/1
12.5 TABLET, FILM COATED ORAL
COMMUNITY

## 2021-12-03 RX ORDER — SUCRALFATE 1 G/10ML
2 SUSPENSION ORAL 4 TIMES DAILY
COMMUNITY

## 2021-12-03 RX ORDER — ERYTHROMYCIN 250 MG/1
500 TABLET, COATED ORAL 2 TIMES DAILY
COMMUNITY

## 2021-12-03 NOTE — ED TRIAGE NOTES
Patient reports at [phone] she woke up with left sided chest pain/pulling sensation she states she began to feel as if she was taking on fluid and couldn't breathe, she reports she then experienced a sharp left shoulder pain into left neck, she reports left eye irritation denies nausea, vomiting, or dizziness

## 2021-12-03 NOTE — DISCHARGE INSTRUCTIONS
Thank you! Thank you for allowing me to care for you in the emergency department. I sincerely hope that you are satisfied with your visit today. It is my goal to provide you with excellent care. Below you will find a list of your labs and imaging from your visit today. Should you have any questions regarding these results please do not hesitate to call the emergency department. Labs -     Recent Results (from the past 12 hour(s))   CBC WITH AUTOMATED DIFF    Collection Time: 12/03/21  7:21 AM   Result Value Ref Range    WBC 6.1 3.6 - 11.0 K/uL    RBC 4.64 3.80 - 5.20 M/uL    HGB 12.8 11.5 - 16.0 g/dL    HCT 39.5 35.0 - 47.0 %    MCV 85.1 80.0 - 99.0 FL    MCH 27.6 26.0 - 34.0 PG    MCHC 32.4 30.0 - 36.5 g/dL    RDW 13.6 11.5 - 14.5 %    PLATELET 871 608 - 270 K/uL    MPV 11.0 8.9 - 12.9 FL    NEUTROPHILS 71 32 - 75 %    LYMPHOCYTES 24 12 - 49 %    MONOCYTES 5 5 - 13 %    EOSINOPHILS 0 0 - 7 %    BASOPHILS 0 0 - 1 %    IMMATURE GRANULOCYTES 0 0.0 - 0.5 %    ABS. NEUTROPHILS 4.3 1.8 - 8.0 K/UL    ABS. LYMPHOCYTES 1.5 0.8 - 3.5 K/UL    ABS. MONOCYTES 0.3 0.0 - 1.0 K/UL    ABS. EOSINOPHILS 0.0 0.0 - 0.4 K/UL    ABS. BASOPHILS 0.0 0.0 - 0.1 K/UL    ABS. IMM. GRANS. 0.0 0.00 - 0.04 K/UL    DF AUTOMATED     METABOLIC PANEL, COMPREHENSIVE    Collection Time: 12/03/21  7:21 AM   Result Value Ref Range    Sodium 143 136 - 145 mmol/L    Potassium 3.9 3.5 - 5.1 mmol/L    Chloride 105 97 - 108 mmol/L    CO2 30 21 - 32 mmol/L    Anion gap 8 5 - 15 mmol/L    Glucose 99 65 - 100 mg/dL    BUN 20 6 - 20 mg/dL    Creatinine 1.36 (H) 0.55 - 1.02 mg/dL    BUN/Creatinine ratio 15 12 - 20      GFR est AA 49 (L) >60 ml/min/1.73m2    GFR est non-AA 41 (L) >60 ml/min/1.73m2    Calcium 9.2 8.5 - 10.1 mg/dL    Bilirubin, total 0.3 0.2 - 1.0 mg/dL    AST (SGOT) 14 (L) 15 - 37 U/L    ALT (SGPT) 20 12 - 78 U/L    Alk.  phosphatase 72 45 - 117 U/L    Protein, total 7.7 6.4 - 8.2 g/dL    Albumin 4.0 3.5 - 5.0 g/dL    Globulin 3.7 2.0 - 4.0 g/dL    A-G Ratio 1.1 1.1 - 2.2     TROPONIN-HIGH SENSITIVITY    Collection Time: 12/03/21  7:21 AM   Result Value Ref Range    Troponin-High Sensitivity 5 0 - 51 ng/L   URINALYSIS W/ RFLX MICROSCOPIC    Collection Time: 12/03/21  9:03 AM   Result Value Ref Range    Color Yellow      Appearance Clear Clear      Specific gravity 1.015 1.003 - 1.030      pH (UA) 5.0 5.0 - 8.0      Protein Negative Negative mg/dL    Glucose Negative Negative mg/dL    Ketone Negative Negative mg/dL    Bilirubin Negative Negative      Blood Negative Negative      Urobilinogen 0.1 (L) 0.2 - 1.0 EU/dL    Nitrites Negative Negative      Leukocyte Esterase Negative Negative     TROPONIN-HIGH SENSITIVITY    Collection Time: 12/03/21  9:45 AM   Result Value Ref Range    Troponin-High Sensitivity 5 0 - 51 ng/L       Radiologic Studies -   XR CHEST PORT   Final Result   The cardiomediastinal silhouette is appropriate for age, technique,   and lung expansion. Pulmonary vasculature is not congested. The lungs are   essentially clear. No effusion or pneumothorax is seen. CT Results  (Last 48 hours)      None          CXR Results  (Last 48 hours)                 12/03/21 0753  XR CHEST PORT Final result    Impression:  The cardiomediastinal silhouette is appropriate for age, technique,   and lung expansion. Pulmonary vasculature is not congested. The lungs are   essentially clear. No effusion or pneumothorax is seen. Narrative:  1 view                      If you feel that you have not received excellent quality care or timely care, please ask to speak to the nurse manager. Please choose us in the future for your continued health care needs. ------------------------------------------------------------------------------------------------------------  The exam and treatment you received in the Emergency Department were for an urgent problem and are not intended as complete care.  It is important that you follow-up with a doctor, nurse practitioner, or physician assistant to:  (1) confirm your diagnosis,  (2) re-evaluation of changes in your illness and treatment, and  (3) for ongoing care. If your symptoms become worse or you do not improve as expected and you are unable to reach your usual health care provider, you should return to the Emergency Department. We are available 24 hours a day. Please take your discharge instructions with you when you go to your follow-up appointment. If you have any problem arranging a follow-up appointment, contact the Emergency Department immediately. If a prescription has been provided, please have it filled as soon as possible to prevent a delay in treatment. Read the entire medication instruction sheet provided to you by the pharmacy. If you have any questions or reservations about taking the medication due to side effects or interactions with other medications, please call your primary care physician or contact the ER to speak with the charge nurse. Make an appointment with your family doctor or the physician you were referred to for follow-up of this visit as instructed on your discharge paperwork, as this is a mandatory follow-up. Return to the ER if you are unable to be seen or if you are unable to be seen in a timely manner. If you have any problem arranging the follow-up visit, contact the Emergency Department immediately.

## 2021-12-03 NOTE — ED PROVIDER NOTES
EMERGENCY DEPARTMENT HISTORY AND PHYSICAL EXAM      Date: 12/3/2021  Patient Name: Gorge Ferreira    History of Presenting Illness     Chief Complaint   Patient presents with    Chest Pain    Shortness of Breath       History Provided By: Patient    HPI: Gorge Ferreira, 45 y.o. female with a past medical history significant hypertension presents to the ED with cc of left sided chest pain. Patient states she woke up about 530 this morning and pain developed subsequently. Pain overall is nonradiating and constant. She has never had it before. Did have a transient episode of shooting left-sided back pain that went up toward her neck. That occurred once or seconds and has not recurred. She does take a daily prophylactic baby aspirin. Patient stated she thought she may be getting fluid in her lungs became very anxious and her anxiety escalated. She has had a prior stress earlier this year because she reports possibly some inverted T waves on EKG but was unsure. The stress test showed exercise-induced hypertension but no cardiac abnormality. She denies cough cold congestion fever chills did not have the vaccine states she cannot but is tested weakly and was tested yesterday. At end of H&P pt mentioned her left eye felt a little irritated and ask that we look at it. She denies trauma, foreign body sensation, pain with movement or vision changes. She does not wear contacts. There are no other complaints, changes, or physical findings at this time. PCP: Funmi Washington MD    No current facility-administered medications on file prior to encounter. Current Outpatient Medications on File Prior to Encounter   Medication Sig Dispense Refill    metoprolol tartrate (LOPRESSOR) 25 mg tablet Take 12.5 mg by mouth.  lidocaine (Lidocaine Viscous) 2 % solution Take 5 mL by mouth as needed for Pain.  lansoprazole (PREVACID) 30 mg capsule Take 30 mg by mouth Daily (before breakfast).       sucralfate (CARAFATE) 100 mg/mL suspension Take 2 tsp by mouth four (4) times daily.  erythromycin base (E-MYCIN) 250 mg tablet Take 500 mg by mouth two (2) times a day. With meals      LORazepam (ATIVAN) 0.5 mg tablet TK 1 T PO ONCE QD PRA 30 Tab 0    [DISCONTINUED] amLODIPine (NORVASC) 2.5 mg tablet Take 1 Tab by mouth daily. 30 Tab 3    famotidine (Pepcid AC) 10 mg tablet Take 10 mg by mouth daily as needed for Heartburn.  multivitamin (ONE A DAY) tablet Take 1 Tab by mouth. Patient stated do not  Take everyday      [DISCONTINUED] fluocinoNIDE (LIDEX) 0.05 % topical cream JACKIE EXT AA TID  FOR BITES AND ITCHING SPOTS ON SKIN  3    [DISCONTINUED] clindamycin (CLEOCIN T) 1 % lotion JACKIE EXT AA BID  11    [DISCONTINUED] GAVISCON EXTRA STRENGTH 254-237.5 mg/5 mL susp TK 10 ML PO QID  5       Past History     Past Medical History:  Past Medical History:   Diagnosis Date    Anxiety     Chronic shoulder pain     Gastrointestinal disorder     GERD    GERD (gastroesophageal reflux disease)     Headache     Migraine headache     Neck pain, bilateral     Neurological disorder     migraines    Other ill-defined conditions(799.89)     migraines    Psychiatric disorder     anxiety       Past Surgical History:  Past Surgical History:   Procedure Laterality Date    HX GYN      cyst removed left ovary    HX HEENT      tonsillectomy    HX ORTHOPAEDIC      left toe arthroplasty    HX OTHER SURGICAL      left breast biopsy    HX OTHER SURGICAL      wisdom teeth    HX WISDOM TEETH EXTRACTION Bilateral     SD BREAST SURGERY PROCEDURE UNLISTED Left     Biopsy       Family History:  Family History   Problem Relation Age of Onset    Cancer Mother         Pancreatic    Cancer Father         Lung    Other Brother         Gunshot       Social History:  Social History     Tobacco Use    Smoking status: Never Smoker    Smokeless tobacco: Never Used   Vaping Use    Vaping Use: Never used   Substance Use Topics    Alcohol use:  No  Drug use: No       Allergies: Allergies   Allergen Reactions    Protonix [Pantoprazole] Swelling    Erythromycin Nausea and Vomiting    Doxycycline Nausea and Vomiting    Influenza Virus Vaccine [facility] 2012-13 (18+) Cell Prieto Unknown (comments)     Patient stated \"it effected my whole body. \"    Sulfa (Sulfonamide Antibiotics) Unknown (comments)     Pt states she cannot get sulfa due to it affecting her kidneys    Augmentin [Amoxicillin-Pot Clavulanate] Nausea Only         Review of Systems     Review of Systems   Constitutional: Negative for activity change, appetite change, fatigue and fever. HENT: Negative. Negative for congestion, rhinorrhea and sore throat. Eyes: Negative for photophobia, pain, discharge, redness and visual disturbance. Left eye irritation   Respiratory: Negative. Negative for cough, shortness of breath and wheezing. Cardiovascular: Positive for chest pain. Negative for leg swelling. Gastrointestinal: Negative. Negative for abdominal distention, abdominal pain, constipation, diarrhea, nausea and vomiting. Endocrine: Negative. Genitourinary: Negative for difficulty urinating and dysuria. Musculoskeletal: Positive for back pain. Negative for arthralgias, joint swelling and myalgias. Skin: Negative. Negative for rash. Neurological: Negative. Negative for dizziness, weakness, light-headedness and headaches. Psychiatric/Behavioral: Negative. Physical Exam     Physical Exam  Vitals and nursing note reviewed. Constitutional:       General: She is not in acute distress. Appearance: Normal appearance. She is not ill-appearing or toxic-appearing. HENT:      Head: Normocephalic. Mouth/Throat:      Mouth: Mucous membranes are moist.   Eyes:      Extraocular Movements: Extraocular movements intact. Conjunctiva/sclera: Conjunctivae normal.      Pupils: Pupils are equal, round, and reactive to light.       Comments: No tearing, no injection or gross defect left eye  No pain with EOM   Cardiovascular:      Rate and Rhythm: Normal rate and regular rhythm. Pulses: Normal pulses. Pulmonary:      Effort: Pulmonary effort is normal.      Breath sounds: Normal breath sounds. Chest:      Chest wall: Tenderness present. Comments: Mild tenderness palpation over mid lateral left chest wall  Abdominal:      General: Abdomen is flat. Bowel sounds are normal.      Palpations: Abdomen is soft. Tenderness: There is no abdominal tenderness. There is no guarding. Musculoskeletal:         General: No swelling. Normal range of motion. Cervical back: Normal range of motion. Skin:     Findings: No erythema or rash. Neurological:      General: No focal deficit present. Mental Status: She is alert and oriented to person, place, and time. Cranial Nerves: No cranial nerve deficit. Psychiatric:         Mood and Affect: Mood normal.         Lab and Diagnostic Study Results     Labs -     Recent Results (from the past 12 hour(s))   CBC WITH AUTOMATED DIFF    Collection Time: 12/03/21  7:21 AM   Result Value Ref Range    WBC 6.1 3.6 - 11.0 K/uL    RBC 4.64 3.80 - 5.20 M/uL    HGB 12.8 11.5 - 16.0 g/dL    HCT 39.5 35.0 - 47.0 %    MCV 85.1 80.0 - 99.0 FL    MCH 27.6 26.0 - 34.0 PG    MCHC 32.4 30.0 - 36.5 g/dL    RDW 13.6 11.5 - 14.5 %    PLATELET 874 951 - 111 K/uL    MPV 11.0 8.9 - 12.9 FL    NEUTROPHILS 71 32 - 75 %    LYMPHOCYTES 24 12 - 49 %    MONOCYTES 5 5 - 13 %    EOSINOPHILS 0 0 - 7 %    BASOPHILS 0 0 - 1 %    IMMATURE GRANULOCYTES 0 0.0 - 0.5 %    ABS. NEUTROPHILS 4.3 1.8 - 8.0 K/UL    ABS. LYMPHOCYTES 1.5 0.8 - 3.5 K/UL    ABS. MONOCYTES 0.3 0.0 - 1.0 K/UL    ABS. EOSINOPHILS 0.0 0.0 - 0.4 K/UL    ABS. BASOPHILS 0.0 0.0 - 0.1 K/UL    ABS. IMM.  GRANS. 0.0 0.00 - 0.04 K/UL    DF AUTOMATED     METABOLIC PANEL, COMPREHENSIVE    Collection Time: 12/03/21  7:21 AM   Result Value Ref Range    Sodium 143 136 - 145 mmol/L    Potassium 3.9 3.5 - 5.1 mmol/L    Chloride 105 97 - 108 mmol/L    CO2 30 21 - 32 mmol/L    Anion gap 8 5 - 15 mmol/L    Glucose 99 65 - 100 mg/dL    BUN 20 6 - 20 mg/dL    Creatinine 1.36 (H) 0.55 - 1.02 mg/dL    BUN/Creatinine ratio 15 12 - 20      GFR est AA 49 (L) >60 ml/min/1.73m2    GFR est non-AA 41 (L) >60 ml/min/1.73m2    Calcium 9.2 8.5 - 10.1 mg/dL    Bilirubin, total 0.3 0.2 - 1.0 mg/dL    AST (SGOT) 14 (L) 15 - 37 U/L    ALT (SGPT) 20 12 - 78 U/L    Alk. phosphatase 72 45 - 117 U/L    Protein, total 7.7 6.4 - 8.2 g/dL    Albumin 4.0 3.5 - 5.0 g/dL    Globulin 3.7 2.0 - 4.0 g/dL    A-G Ratio 1.1 1.1 - 2.2         Radiologic Studies -   @lastxrresult@  CT Results  (Last 48 hours)    None        CXR Results  (Last 48 hours)               12/03/21 0753  XR CHEST PORT Final result    Impression:  The cardiomediastinal silhouette is appropriate for age, technique,   and lung expansion. Pulmonary vasculature is not congested. The lungs are   essentially clear. No effusion or pneumothorax is seen. Narrative:  1 view                   Medical Decision Making   - I am the first provider for this patient. - I reviewed the vital signs, available nursing notes, past medical history, past surgical history, family history and social history. - Initial assessment performed. The patients presenting problems have been discussed, and they are in agreement with the care plan formulated and outlined with them. I have encouraged them to ask questions as they arise throughout their visit. Vital Signs-Reviewed the patient's vital signs. Patient Vitals for the past 12 hrs:   Temp Pulse Resp BP SpO2   12/03/21 0732 98.6 °F (37 °C) 71 18 (!) 149/86 98 %       Records Reviewed: Nursing Notes    The patient presents with chest pain with a differential diagnosis of  abnormal EKG, ACS, arrhythmia, acute MI, chest wall pain, costochondritis and GERD.     EKG interpretation: (Preliminary) 7:34 AM (read 7:36)  Rhythm: normal sinus rhythm; and regular . Rate (approx.): 61; Axis: normal; AR interval: normal; QRS interval: normal ; ST/T wave: normal      ED Course:     ED Course as of 12/09/21 1150   Fri Dec 03, 2021   9799 Was updated on her initial set of blood work which showed a troponin of 5. Algorithm instructs repeat at 2 hours given symptoms started less than 3 hours ago. Patient was informed of this timeframe. [LG]      ED Course User Index  [LG] Edson Avilez MD       Provider Notes (Medical Decision Making): MDM   Patient does not have any family history of cardiac disease with her only risk factor being hypertension low suspicion for ACS or acute MI. However she will need to set rule out due to the timing of her symptoms being less than 3 hours this was explained to patient. He denies needing anything for pain at this time. Pt's eye appears without irritation, tearing, redness. Offered to irrigate  And further exam with stain which pt initially accepted then declined. Pt denies any vision changes- no blurred vision, double vision or pain with eye movements. Pt will follow-up if worsens. Procedures   Medical Decision Makingedical Decision Making  Performed by: Florencia Schlatter, MD  PROCEDURES:  Procedures     Irrigation left eye: attempted left eye irrigation however pt preferred we stop. Didn't feel it was helpful. Disposition   Disposition: Condition stable        DISCHARGE PLAN:  1. Current Discharge Medication List      CONTINUE these medications which have NOT CHANGED    Details   LORazepam (ATIVAN) 0.5 mg tablet TK 1 T PO ONCE QD PRA  Qty: 30 Tab, Refills: 0    Associated Diagnoses: Panic disorder; Anxiety      amLODIPine (NORVASC) 2.5 mg tablet Take 1 Tab by mouth daily. Qty: 30 Tab, Refills: 3    Associated Diagnoses: Hypertension goal BP (blood pressure) < 130/80      famotidine (Pepcid AC) 10 mg tablet Take 10 mg by mouth daily as needed for Heartburn.       multivitamin (ONE A DAY) tablet Take 1 Tab by mouth. Patient stated do not  Take everyday      fluocinoNIDE (LIDEX) 0.05 % topical cream JACKIE EXT AA TID  FOR BITES AND ITCHING SPOTS ON SKIN  Refills: 3      clindamycin (CLEOCIN T) 1 % lotion JACKIE EXT AA BID  Refills: 11      GAVISCON EXTRA STRENGTH 254-237.5 mg/5 mL susp TK 10 ML PO QID  Refills: 5           2. Follow-up Information     Follow up With Specialties Details Why Contact Info    Opal Crane MD Internal Medicine In 3 days  [address] Emergency Medicine  As needed, If symptoms worsen/RECUR [address] [place] [id number] [phone]        3. Return to ED if worse   4. Current Discharge Medication List            Diagnosis     Clinical Impression:   1. Chest pain, unspecified type    2. Chest wall pain    3. Irritation of left eye        Attestations:    Karina Zhou MD    Please note that this dictation was completed with [employer], the computer voice recognition software. Quite often unanticipated grammatical, syntax, homophones, and other interpretive errors are inadvertently transcribed by the computer software. Please disregard these errors. Please excuse any errors that have escaped final proofreading. Thank you.

## 2022-03-18 PROBLEM — K62.5 RECTAL HEMORRHAGE: Status: ACTIVE | Noted: 2018-11-17

## 2022-03-18 PROBLEM — G57.30 SUPERFICIAL PERONEAL NERVE NEUROPATHY: Status: ACTIVE | Noted: 2018-11-17

## 2022-03-18 PROBLEM — L21.9 SEBORRHEIC ECZEMA: Status: ACTIVE | Noted: 2018-11-17

## 2022-03-18 PROBLEM — L73.9 FOLLICULITIS: Status: ACTIVE | Noted: 2018-11-17

## 2022-03-19 PROBLEM — E66.01 OBESITY, MORBID (HCC): Status: ACTIVE | Noted: 2020-11-09

## 2022-03-19 PROBLEM — M76.819 ANTERIOR TIBIALIS TENDINITIS: Status: ACTIVE | Noted: 2018-11-17

## 2022-03-19 PROBLEM — E55.9 VITAMIN D DEFICIENCY: Status: ACTIVE | Noted: 2018-11-17

## 2022-03-19 PROBLEM — M89.8X1 PAIN IN SCAPULA: Status: ACTIVE | Noted: 2018-11-17

## 2022-03-19 PROBLEM — H00.019 HORDEOLUM EXTERNUM: Status: ACTIVE | Noted: 2018-11-17

## 2022-03-19 PROBLEM — M79.606 PAIN OF LOWER EXTREMITY: Status: ACTIVE | Noted: 2018-11-17

## 2022-03-19 PROBLEM — E66.9 OBESITY: Status: ACTIVE | Noted: 2018-11-17

## 2022-03-20 PROBLEM — H02.9 LESION OF EYELID: Status: ACTIVE | Noted: 2018-11-17

## 2022-03-20 PROBLEM — J30.1 ALLERGIC RHINITIS DUE TO POLLEN: Status: ACTIVE | Noted: 2018-11-17

## 2022-03-20 PROBLEM — K21.9 GASTROESOPHAGEAL REFLUX DISEASE: Status: ACTIVE | Noted: 2018-11-17

## 2024-07-30 ENCOUNTER — HOSPITAL ENCOUNTER (EMERGENCY)
Facility: HOSPITAL | Age: 54
Discharge: HOME | End: 2024-07-30
Attending: EMERGENCY MEDICINE
Payer: COMMERCIAL

## 2024-07-30 VITALS
HEIGHT: 62 IN | RESPIRATION RATE: 16 BRPM | WEIGHT: 168 LBS | DIASTOLIC BLOOD PRESSURE: 82 MMHG | OXYGEN SATURATION: 100 % | TEMPERATURE: 98 F | HEART RATE: 58 BPM | SYSTOLIC BLOOD PRESSURE: 154 MMHG | BODY MASS INDEX: 30.91 KG/M2

## 2024-07-30 DIAGNOSIS — M54.32 BILATERAL SCIATICA: Primary | ICD-10-CM

## 2024-07-30 DIAGNOSIS — M54.31 BILATERAL SCIATICA: Primary | ICD-10-CM

## 2024-07-30 PROCEDURE — 99283 EMERGENCY DEPT VISIT LOW MDM: CPT

## 2024-07-30 RX ORDER — CYCLOBENZAPRINE HCL 10 MG
10 TABLET ORAL 3 TIMES DAILY PRN
Qty: 21 TABLET | Refills: 0 | Status: SHIPPED | OUTPATIENT
Start: 2024-07-30 | End: 2024-08-06

## 2024-07-30 ASSESSMENT — ENCOUNTER SYMPTOMS
NAUSEA: 0
VOMITING: 0
BACK PAIN: 1
SHORTNESS OF BREATH: 0
ABDOMINAL PAIN: 0

## 2024-07-30 ASSESSMENT — PAIN - FUNCTIONAL ASSESSMENT: PAIN_FUNCTIONAL_ASSESSMENT: 0-10

## 2024-07-30 ASSESSMENT — LIFESTYLE VARIABLES
HOW MANY STANDARD DRINKS CONTAINING ALCOHOL DO YOU HAVE ON A TYPICAL DAY: PATIENT DOES NOT DRINK
HOW OFTEN DO YOU HAVE A DRINK CONTAINING ALCOHOL: NEVER

## 2024-07-30 ASSESSMENT — PAIN DESCRIPTION - ORIENTATION: ORIENTATION: RIGHT;LEFT

## 2024-07-30 ASSESSMENT — PAIN DESCRIPTION - LOCATION: LOCATION: BACK;BUTTOCKS

## 2024-07-30 ASSESSMENT — PAIN SCALES - GENERAL: PAINLEVEL_OUTOF10: 10

## 2024-07-30 ASSESSMENT — PAIN DESCRIPTION - DIRECTION: RADIATING_TOWARDS: HAMSTRING

## 2024-07-30 NOTE — ED TRIAGE NOTES
Pt here with back pain that radiates down her buttock and leg that she woke up with yesterday. Pt went to [facility] and a Chiropractor yesterday where they dx it as \"muscoskeletal pain\", pt received stretching exercises and heat therapy yesterday at the chiropractor which did help, and a Urinalysis was done at [facility] to rule out UTI. Pt denies any UA symptoms. Pt denies any injury. Endorses travel out of town, and returned on Wednesday of last week as long distance. Pt took a muscle relaxer last night, has not had anything for pain. Pt here today because pain is back this AM. Denies any loss of bowel or bladder.     Pt is ambulatory without obvious difficulty. Pt appears most uncomfortable when sitting.

## 2024-07-30 NOTE — ED PROVIDER NOTES
DIAGNOSTIC RESULTS     EKG: All EKG's are interpreted by the Emergency Department Physician who either signs or Co-signs this chart in the absence of a cardiologist.    RADIOLOGY:   Non-plain film images such as CT, Ultrasound and MRI are read by the radiologist. Plain radiographic images are visualized and preliminarily interpreted by the emergency physician with the below findings:    Interpretation per the Radiologist below, if available at the time of this note:    No orders to display        LABS:  Labs Reviewed - No data to display    All other labs were within normal range or not returned as of this dictation.    EMERGENCY DEPARTMENT COURSE and DIFFERENTIAL DIAGNOSIS/MDM:   Vitals:    Vitals:    07/30/24 0915   BP: (!) 154/82   Pulse: 58   Resp: 16   Temp: 98 °F (36.7 °C)   TempSrc: Oral   SpO2: 100%   Weight: 76.2 kg (168 lb)   Height: 1.562 m (5' 1.5\")       Medical Decision Making  Risk  Prescription drug management.    Patient is a 54-year-old female presenting to the emergency room complaining of bilateral lower back pain that radiates down bilateral legs for the past 2 days.  Doubt acute fracture or injury given no bony tenderness was noted with palpation and patient denies any recent falls or injury.  Doubt cauda equina given patient denies saddle anesthesia or issues controlling bowel or bladder.  Doubt nephrolithiasis or pyelonephritis given patient was nontender with CVA palpation and patient denies urinary symptoms.  Suspect sciatica given assessment.  Patient is encouraged to start the prednisone prescribed by [facility].  Patient is also given a prescription for Flexeril to take as needed for muscle spasms.  Patient is educated to stop taking the Robaxin and not to drive after taking the muscle relaxers.  Patient is encouraged to follow-up closely with orthopedic provider for evaluation. Strict return to ED precautions given. ACI discussed with the patient; see instruction below.

## 2024-07-30 NOTE — DISCHARGE INSTRUCTIONS
Take tylenol as needed for pain.     Take the Flexeril as needed for muscle spasms. DO NOT DRIVE AFTER TAKING THE FLEXERIL.     Start the predisone steroid pack. Take with food.     Follow-up with the orthopedic provider for reevaluation. Call for appointment as soon as possible.